# Patient Record
Sex: FEMALE | Race: WHITE | NOT HISPANIC OR LATINO
[De-identification: names, ages, dates, MRNs, and addresses within clinical notes are randomized per-mention and may not be internally consistent; named-entity substitution may affect disease eponyms.]

---

## 2017-06-03 ENCOUNTER — HEALTH MAINTENANCE LETTER (OUTPATIENT)
Age: 32
End: 2017-06-03

## 2017-08-17 ENCOUNTER — TRANSFERRED RECORDS (OUTPATIENT)
Dept: HEALTH INFORMATION MANAGEMENT | Facility: CLINIC | Age: 32
End: 2017-08-17

## 2017-08-17 DIAGNOSIS — Z79.899 HIGH RISK MEDICATION USE: ICD-10-CM

## 2017-08-17 DIAGNOSIS — T43.505A NEUROLEPTIC-INDUCED ACUTE AKATHISIA: Primary | ICD-10-CM

## 2017-08-17 DIAGNOSIS — G25.71 NEUROLEPTIC-INDUCED ACUTE AKATHISIA: Primary | ICD-10-CM

## 2017-08-17 LAB
CHOLEST SERPL-MCNC: 133 MG/DL
GLUCOSE SERPL-MCNC: 95 MG/DL (ref 70–99)
HBA1C MFR BLD: 5.1 % (ref 4.3–6)
HDLC SERPL-MCNC: 43 MG/DL
LDLC SERPL CALC-MCNC: 72 MG/DL
NONHDLC SERPL-MCNC: 90 MG/DL
PROLACTIN SERPL-MCNC: 18 UG/L (ref 3–27)
TRIGL SERPL-MCNC: 91 MG/DL

## 2017-08-17 PROCEDURE — 82947 ASSAY GLUCOSE BLOOD QUANT: CPT | Performed by: INTERNAL MEDICINE

## 2017-08-17 PROCEDURE — 36415 COLL VENOUS BLD VENIPUNCTURE: CPT | Performed by: INTERNAL MEDICINE

## 2017-08-17 PROCEDURE — 84146 ASSAY OF PROLACTIN: CPT | Performed by: PHYSICIAN ASSISTANT

## 2017-08-17 PROCEDURE — 80061 LIPID PANEL: CPT | Performed by: INTERNAL MEDICINE

## 2017-08-17 PROCEDURE — 83036 HEMOGLOBIN GLYCOSYLATED A1C: CPT | Performed by: INTERNAL MEDICINE

## 2019-05-13 ENCOUNTER — DOCUMENTATION ONLY (OUTPATIENT)
Dept: FAMILY MEDICINE | Facility: CLINIC | Age: 34
End: 2019-05-13

## 2019-05-13 NOTE — PROGRESS NOTES
Called patient and she stated she has paper copies she will be bringing with her tomorrow from [facility].     Shalini Schwartz RN

## 2019-05-13 NOTE — PROGRESS NOTES
I have never seen patient.     She saw [facility] but not since 2011.    Unsure what the lab are for. She is a [facility] patient ( psychiatry ).    Please call patient to clarify.    Harsha German MD

## 2019-05-14 DIAGNOSIS — Z79.899 HIGH RISK MEDICATIONS (NOT ANTICOAGULANTS) LONG-TERM USE: Primary | ICD-10-CM

## 2019-05-14 LAB
CHOLEST SERPL-MCNC: 129 MG/DL
GLUCOSE SERPL-MCNC: 81 MG/DL (ref 70–99)
HBA1C MFR BLD: 5.1 % (ref 0–5.6)
HDLC SERPL-MCNC: 43 MG/DL
LDLC SERPL CALC-MCNC: 69 MG/DL
NONHDLC SERPL-MCNC: 86 MG/DL
PROLACTIN SERPL-MCNC: 14 UG/L (ref 3–27)
TRIGL SERPL-MCNC: 87 MG/DL

## 2019-05-14 PROCEDURE — 36415 COLL VENOUS BLD VENIPUNCTURE: CPT | Performed by: PHYSICIAN ASSISTANT

## 2019-05-14 PROCEDURE — 83036 HEMOGLOBIN GLYCOSYLATED A1C: CPT | Performed by: PHYSICIAN ASSISTANT

## 2019-05-14 PROCEDURE — 80061 LIPID PANEL: CPT | Performed by: PHYSICIAN ASSISTANT

## 2019-05-14 PROCEDURE — 84146 ASSAY OF PROLACTIN: CPT | Performed by: PHYSICIAN ASSISTANT

## 2019-05-14 PROCEDURE — 82947 ASSAY GLUCOSE BLOOD QUANT: CPT | Performed by: PHYSICIAN ASSISTANT

## 2019-10-22 ENCOUNTER — MEDICAL CORRESPONDENCE (OUTPATIENT)
Dept: HEALTH INFORMATION MANAGEMENT | Facility: CLINIC | Age: 34
End: 2019-10-22

## 2020-02-17 DIAGNOSIS — Z79.899 ENCOUNTER FOR LONG-TERM (CURRENT) USE OF OTHER MEDICATIONS: Primary | ICD-10-CM

## 2020-02-17 LAB — PROLACTIN SERPL-MCNC: 13 UG/L (ref 3–27)

## 2020-02-17 PROCEDURE — 36415 COLL VENOUS BLD VENIPUNCTURE: CPT

## 2020-02-17 PROCEDURE — 84146 ASSAY OF PROLACTIN: CPT

## 2020-12-30 ENCOUNTER — TRANSFERRED RECORDS (OUTPATIENT)
Dept: HEALTH INFORMATION MANAGEMENT | Facility: CLINIC | Age: 35
End: 2020-12-30

## 2021-06-29 ENCOUNTER — TRANSFERRED RECORDS (OUTPATIENT)
Dept: HEALTH INFORMATION MANAGEMENT | Facility: CLINIC | Age: 36
End: 2021-06-29

## 2021-08-09 ENCOUNTER — LAB (OUTPATIENT)
Dept: LAB | Facility: CLINIC | Age: 36
End: 2021-08-09
Payer: COMMERCIAL

## 2021-08-09 DIAGNOSIS — Z79.899 ENCOUNTER FOR LONG-TERM (CURRENT) USE OF HIGH-RISK MEDICATION: Primary | ICD-10-CM

## 2021-08-09 LAB
CHOLEST SERPL-MCNC: 165 MG/DL
FASTING STATUS PATIENT QL REPORTED: YES
FASTING STATUS PATIENT QL REPORTED: YES
GLUCOSE BLD-MCNC: 91 MG/DL (ref 70–99)
HBA1C MFR BLD: 5.1 % (ref 0–5.6)
HDLC SERPL-MCNC: 44 MG/DL
LDLC SERPL CALC-MCNC: 104 MG/DL
NONHDLC SERPL-MCNC: 121 MG/DL
PROLACTIN SERPL-MCNC: 14 UG/L (ref 3–27)
TRIGL SERPL-MCNC: 83 MG/DL

## 2021-08-09 PROCEDURE — 84146 ASSAY OF PROLACTIN: CPT

## 2021-08-09 PROCEDURE — 36415 COLL VENOUS BLD VENIPUNCTURE: CPT

## 2021-08-09 PROCEDURE — 80061 LIPID PANEL: CPT

## 2021-08-09 PROCEDURE — 83036 HEMOGLOBIN GLYCOSYLATED A1C: CPT

## 2021-08-09 PROCEDURE — 82947 ASSAY GLUCOSE BLOOD QUANT: CPT

## 2021-10-15 ENCOUNTER — OFFICE VISIT (OUTPATIENT)
Dept: FAMILY MEDICINE | Facility: CLINIC | Age: 36
End: 2021-10-15
Payer: COMMERCIAL

## 2021-10-15 VITALS
RESPIRATION RATE: 8 BRPM | DIASTOLIC BLOOD PRESSURE: 81 MMHG | HEIGHT: 67 IN | WEIGHT: 293 LBS | OXYGEN SATURATION: 99 % | SYSTOLIC BLOOD PRESSURE: 122 MMHG | HEART RATE: 73 BPM | BODY MASS INDEX: 45.99 KG/M2 | TEMPERATURE: 97.7 F

## 2021-10-15 DIAGNOSIS — F20.9 SCHIZOPHRENIA, UNSPECIFIED TYPE (H): ICD-10-CM

## 2021-10-15 DIAGNOSIS — N92.1 MENORRHAGIA WITH IRREGULAR CYCLE: Primary | ICD-10-CM

## 2021-10-15 DIAGNOSIS — Z23 NEED FOR PROPHYLACTIC VACCINATION AND INOCULATION AGAINST INFLUENZA: ICD-10-CM

## 2021-10-15 LAB
BASOPHILS # BLD AUTO: 0 10E3/UL (ref 0–0.2)
BASOPHILS NFR BLD AUTO: 0 %
EOSINOPHIL # BLD AUTO: 0.2 10E3/UL (ref 0–0.7)
EOSINOPHIL NFR BLD AUTO: 2 %
ERYTHROCYTE [DISTWIDTH] IN BLOOD BY AUTOMATED COUNT: 14.9 % (ref 10–15)
HCT VFR BLD AUTO: 31.3 % (ref 35–47)
HGB BLD-MCNC: 9.9 G/DL (ref 11.7–15.7)
LYMPHOCYTES # BLD AUTO: 1.8 10E3/UL (ref 0.8–5.3)
LYMPHOCYTES NFR BLD AUTO: 24 %
MCH RBC QN AUTO: 27.3 PG (ref 26.5–33)
MCHC RBC AUTO-ENTMCNC: 31.6 G/DL (ref 31.5–36.5)
MCV RBC AUTO: 86 FL (ref 78–100)
MONOCYTES # BLD AUTO: 0.4 10E3/UL (ref 0–1.3)
MONOCYTES NFR BLD AUTO: 6 %
NEUTROPHILS # BLD AUTO: 4.9 10E3/UL (ref 1.6–8.3)
NEUTROPHILS NFR BLD AUTO: 67 %
PLATELET # BLD AUTO: 272 10E3/UL (ref 150–450)
RBC # BLD AUTO: 3.63 10E6/UL (ref 3.8–5.2)
TSH SERPL DL<=0.005 MIU/L-ACNC: 1.64 MU/L (ref 0.4–4)
WBC # BLD AUTO: 7.3 10E3/UL (ref 4–11)

## 2021-10-15 PROCEDURE — 99204 OFFICE O/P NEW MOD 45 MIN: CPT | Mod: 25 | Performed by: PHYSICIAN ASSISTANT

## 2021-10-15 PROCEDURE — 90686 IIV4 VACC NO PRSV 0.5 ML IM: CPT | Performed by: PHYSICIAN ASSISTANT

## 2021-10-15 PROCEDURE — 36415 COLL VENOUS BLD VENIPUNCTURE: CPT | Performed by: PHYSICIAN ASSISTANT

## 2021-10-15 PROCEDURE — 90471 IMMUNIZATION ADMIN: CPT | Performed by: PHYSICIAN ASSISTANT

## 2021-10-15 PROCEDURE — 85025 COMPLETE CBC W/AUTO DIFF WBC: CPT | Performed by: PHYSICIAN ASSISTANT

## 2021-10-15 PROCEDURE — 84443 ASSAY THYROID STIM HORMONE: CPT | Performed by: PHYSICIAN ASSISTANT

## 2021-10-15 RX ORDER — BUPROPION HYDROCHLORIDE 300 MG/1
TABLET ORAL
COMMUNITY
Start: 2021-08-11

## 2021-10-15 RX ORDER — NORGESTIMATE AND ETHINYL ESTRADIOL 7DAYSX3 28
1 KIT ORAL DAILY
Qty: 84 TABLET | Refills: 1 | Status: SHIPPED | OUTPATIENT
Start: 2021-10-15 | End: 2022-04-04

## 2021-10-15 RX ORDER — BENZTROPINE MESYLATE 2 MG/1
2 TABLET ORAL AT BEDTIME
COMMUNITY
Start: 2021-09-28

## 2021-10-15 ASSESSMENT — PAIN SCALES - GENERAL: PAINLEVEL: NO PAIN (0)

## 2021-10-15 ASSESSMENT — MIFFLIN-ST. JEOR: SCORE: 2290.16

## 2021-10-15 NOTE — PROGRESS NOTES
"    Assessment & Plan     Menorrhagia with irregular cycle  Labs, will restart OCP. Discussed potential side effects and how to start medications.   - TSH WITH FREE T4 REFLEX; Future  - CBC with Platelets & Differential; Future  - US Pelvic Complete with Transvaginal; Future  - TSH WITH FREE T4 REFLEX  - CBC with Platelets & Differential    Adult BMI 50.0-59.9 kg/sq m (H)  Suspect with weight and possible PCOS could be contirbuting.     Schizophrenia, unspecified type (H)  Managed by [facility].     Need for prophylactic vaccination and inoculation against influenza  - INFLUENZA VACCINE IM > 6 MONTHS VALENT IIV4 (AFLURIA/FLUZONE)             BMI:   Estimated body mass index is 53.27 kg/m  as calculated from the following:    Height as of this encounter: 1.714 m (5' 7.48\").    Weight as of this encounter: 156.5 kg (345 lb).           Return in about 4 weeks (around 11/12/2021) for Physical Exam.    DAVID Joe [facility] SHAN Sales is a 36 year old who presents for the following health issues   HPI   Chief Complaint   Patient presents with     Abnormal Bleeding Problem     Her period stopped 1 week ago. It started 2.5 months ago. Spotting, not heavy. No painful cramping. Some blood clots.   When she was on the birth control it was helpful.   Not sexually active currently. Has been previously.Has never had a pap smear.   No vaginal discharge.   Denies possibility of pregnancy.     Patient's schizophrenia is managed through [facility].   No personal or family history of blood clots.    Review of Systems   Constitutional, HEENT, cardiovascular, pulmonary, gi and gu systems are negative, except as otherwise noted.      Objective    /81 (BP Location: Right arm)   Pulse 73   Temp 97.7  F (36.5  C) (Oral)   Resp 8   Ht 1.714 m (5' 7.48\")   Wt (!) 156.5 kg (345 lb)   SpO2 99%   BMI 53.27 kg/m    Body mass index is 53.27 kg/m .  Physical Exam   GENERAL: healthy, alert and no " distress  ABDOMEN: soft, nontender, no hepatosplenomegaly, no masses   PSYCH: mentation appears normal, affect flat/blunted

## 2021-10-18 ENCOUNTER — TELEPHONE (OUTPATIENT)
Dept: FAMILY MEDICINE | Facility: CLINIC | Age: 36
End: 2021-10-18

## 2021-10-18 DIAGNOSIS — N92.1 MENORRHAGIA WITH IRREGULAR CYCLE: Primary | ICD-10-CM

## 2021-10-18 RX ORDER — FERROUS SULFATE 325(65) MG
325 TABLET ORAL
Qty: 90 TABLET | Refills: 0 | Status: SHIPPED | OUTPATIENT
Start: 2021-10-18 | End: 2022-09-30

## 2021-10-18 NOTE — TELEPHONE ENCOUNTER
RN called patient and relayed provider's message. Patient verbalized understanding.     Cherelle Rae RN, BSN, PHN  [facility]: [place]

## 2021-10-18 NOTE — TELEPHONE ENCOUNTER
Please call patient. She needs to start taking ferrous sulfate 325mg once daily. She should make sure to schedule her pelvic ultrasound and follow up in 6 weeks in clinic or virtually with PCP. RN can send to preferred pharmacy.   Tresa Sosa PA-C

## 2021-10-19 ENCOUNTER — ANCILLARY PROCEDURE (OUTPATIENT)
Dept: ULTRASOUND IMAGING | Facility: CLINIC | Age: 36
End: 2021-10-19
Attending: PHYSICIAN ASSISTANT
Payer: COMMERCIAL

## 2021-10-19 DIAGNOSIS — N92.1 MENORRHAGIA WITH IRREGULAR CYCLE: ICD-10-CM

## 2021-10-19 PROCEDURE — 76830 TRANSVAGINAL US NON-OB: CPT | Performed by: RADIOLOGY

## 2021-10-19 PROCEDURE — 76856 US EXAM PELVIC COMPLETE: CPT | Performed by: RADIOLOGY

## 2021-10-19 NOTE — RESULT ENCOUNTER NOTE
Please call the patient with these results:    Mónica, your ultrasound shows a simple cyst on the right ovary. Typically these go away on their own and do not need any treatment or follow up.    Shari Keller, CNP

## 2021-11-29 ENCOUNTER — OFFICE VISIT (OUTPATIENT)
Dept: FAMILY MEDICINE | Facility: CLINIC | Age: 36
End: 2021-11-29
Payer: COMMERCIAL

## 2021-11-29 VITALS
OXYGEN SATURATION: 98 % | TEMPERATURE: 98.4 F | WEIGHT: 293 LBS | HEART RATE: 124 BPM | SYSTOLIC BLOOD PRESSURE: 139 MMHG | BODY MASS INDEX: 53.58 KG/M2 | DIASTOLIC BLOOD PRESSURE: 85 MMHG

## 2021-11-29 DIAGNOSIS — Z12.4 SCREENING FOR CERVICAL CANCER: ICD-10-CM

## 2021-11-29 DIAGNOSIS — Z00.00 ENCOUNTER FOR MEDICARE ANNUAL WELLNESS EXAM: Primary | ICD-10-CM

## 2021-11-29 DIAGNOSIS — Z11.59 NEED FOR HEPATITIS C SCREENING TEST: ICD-10-CM

## 2021-11-29 DIAGNOSIS — F20.9 SCHIZOPHRENIA, UNSPECIFIED TYPE (H): ICD-10-CM

## 2021-11-29 DIAGNOSIS — Z11.4 SCREENING FOR HIV (HUMAN IMMUNODEFICIENCY VIRUS): ICD-10-CM

## 2021-11-29 DIAGNOSIS — D62 ANEMIA DUE TO BLOOD LOSS, ACUTE: ICD-10-CM

## 2021-11-29 LAB
BASOPHILS # BLD AUTO: 0 10E3/UL (ref 0–0.2)
BASOPHILS NFR BLD AUTO: 0 %
EOSINOPHIL # BLD AUTO: 0.2 10E3/UL (ref 0–0.7)
EOSINOPHIL NFR BLD AUTO: 2 %
ERYTHROCYTE [DISTWIDTH] IN BLOOD BY AUTOMATED COUNT: 16.2 % (ref 10–15)
FERRITIN SERPL-MCNC: 17 NG/ML (ref 12–150)
HCT VFR BLD AUTO: 29.8 % (ref 35–47)
HGB BLD-MCNC: 8.9 G/DL (ref 11.7–15.7)
LYMPHOCYTES # BLD AUTO: 2.1 10E3/UL (ref 0.8–5.3)
LYMPHOCYTES NFR BLD AUTO: 24 %
MCH RBC QN AUTO: 26.9 PG (ref 26.5–33)
MCHC RBC AUTO-ENTMCNC: 29.9 G/DL (ref 31.5–36.5)
MCV RBC AUTO: 90 FL (ref 78–100)
MONOCYTES # BLD AUTO: 0.7 10E3/UL (ref 0–1.3)
MONOCYTES NFR BLD AUTO: 8 %
NEUTROPHILS # BLD AUTO: 5.8 10E3/UL (ref 1.6–8.3)
NEUTROPHILS NFR BLD AUTO: 66 %
PLATELET # BLD AUTO: 246 10E3/UL (ref 150–450)
RBC # BLD AUTO: 3.31 10E6/UL (ref 3.8–5.2)
WBC # BLD AUTO: 8.8 10E3/UL (ref 4–11)

## 2021-11-29 PROCEDURE — 90471 IMMUNIZATION ADMIN: CPT | Performed by: PHYSICIAN ASSISTANT

## 2021-11-29 PROCEDURE — 87389 HIV-1 AG W/HIV-1&-2 AB AG IA: CPT | Performed by: PHYSICIAN ASSISTANT

## 2021-11-29 PROCEDURE — G0145 SCR C/V CYTO,THINLAYER,RESCR: HCPCS | Performed by: PHYSICIAN ASSISTANT

## 2021-11-29 PROCEDURE — 85025 COMPLETE CBC W/AUTO DIFF WBC: CPT | Performed by: PHYSICIAN ASSISTANT

## 2021-11-29 PROCEDURE — 87624 HPV HI-RISK TYP POOLED RSLT: CPT | Performed by: PHYSICIAN ASSISTANT

## 2021-11-29 PROCEDURE — 99395 PREV VISIT EST AGE 18-39: CPT | Mod: 25 | Performed by: PHYSICIAN ASSISTANT

## 2021-11-29 PROCEDURE — 86803 HEPATITIS C AB TEST: CPT | Performed by: PHYSICIAN ASSISTANT

## 2021-11-29 PROCEDURE — 90715 TDAP VACCINE 7 YRS/> IM: CPT | Performed by: PHYSICIAN ASSISTANT

## 2021-11-29 PROCEDURE — 82728 ASSAY OF FERRITIN: CPT | Performed by: PHYSICIAN ASSISTANT

## 2021-11-29 PROCEDURE — 36415 COLL VENOUS BLD VENIPUNCTURE: CPT | Performed by: PHYSICIAN ASSISTANT

## 2021-11-29 ASSESSMENT — ENCOUNTER SYMPTOMS
BREAST MASS: 0
HEMATURIA: 0
CHILLS: 0
SORE THROAT: 0
DIZZINESS: 0
EYE PAIN: 0
WEAKNESS: 0
JOINT SWELLING: 0
SHORTNESS OF BREATH: 0
DYSURIA: 0
CONSTIPATION: 0
PALPITATIONS: 0
ARTHRALGIAS: 0
DIARRHEA: 0
HEARTBURN: 0
ABDOMINAL PAIN: 0
HEMATOCHEZIA: 0
FREQUENCY: 0
PARESTHESIAS: 0
MYALGIAS: 0
HEADACHES: 0
NERVOUS/ANXIOUS: 0
FEVER: 0
NAUSEA: 0
COUGH: 0

## 2021-11-29 ASSESSMENT — PAIN SCALES - GENERAL: PAINLEVEL: NO PAIN (0)

## 2021-11-29 ASSESSMENT — PATIENT HEALTH QUESTIONNAIRE - PHQ9
SUM OF ALL RESPONSES TO PHQ QUESTIONS 1-9: 4
SUM OF ALL RESPONSES TO PHQ QUESTIONS 1-9: 4
10. IF YOU CHECKED OFF ANY PROBLEMS, HOW DIFFICULT HAVE THESE PROBLEMS MADE IT FOR YOU TO DO YOUR WORK, TAKE CARE OF THINGS AT HOME, OR GET ALONG WITH OTHER PEOPLE: NOT DIFFICULT AT ALL

## 2021-11-29 ASSESSMENT — ACTIVITIES OF DAILY LIVING (ADL): CURRENT_FUNCTION: NO ASSISTANCE NEEDED

## 2021-11-29 NOTE — PROGRESS NOTES
"SUBJECTIVE:   Mónica Yoon is a 36 year old female who presents for Preventive Visit.      Patient has been advised of split billing requirements and indicates understanding: Yes   Are you in the first 12 months of your Medicare coverage?  No    Healthy Habits:     In general, how would you rate your overall health?  Fair    Frequency of exercise:  None    Do you usually eat at least 4 servings of fruit and vegetables a day, include whole grains    & fiber and avoid regularly eating high fat or \"junk\" foods?  No    Taking medications regularly:  Yes    Medication side effects:  None    Ability to successfully perform activities of daily living:  No assistance needed    Home Safety:  No safety concerns identified    Hearing Impairment:  No hearing concerns    In the past 6 months, have you been bothered by leaking of urine?  No    In general, how would you rate your overall mental or emotional health?  Good      PHQ-2 Total Score: 4    Additional concerns today:  No    Not currently sexually active. Has been in the past. Has never had a pap smear.   No current vaginal bleeding - hasn't been bleeding in about a week.   Lives with her mom and sister. Not working.     Do you feel safe in your environment? Yes    Have you ever done Advance Care Planning? (For example, a Health Directive, POLST, or a discussion with a medical provider or your loved ones about your wishes): No, advance care planning information given to patient to review.  Patient declined advance care planning discussion at this time.       Fall risk  Fallen 2 or more times in the past year?: No  Any fall with injury in the past year?: No    Cognitive Screening   1) Repeat 3 items (Leader, Season, Table)    2) Clock draw: NORMAL  3) 3 item recall: Recalls 1 object   Results: NORMAL clock, 1-2 items recalled: COGNITIVE IMPAIRMENT LESS LIKELY    Mini-CogTM Copyright [employer]. Licensed by the author for use in [facility]; reprinted with " permission (dasha@[facility]). All rights reserved.      Do you have sleep apnea, excessive snoring or daytime drowsiness?: no    Reviewed and updated as needed this visit by clinical staff  Tobacco  Allergies  Meds   Med Hx  Surg Hx  Fam Hx  Soc Hx       Reviewed and updated as needed this visit by Provider               Social History     Tobacco Use     Smoking status: Former Smoker     Smokeless tobacco: Never Used     Tobacco comment: quit 2010   Substance Use Topics     Alcohol use: No     If you drink alcohol do you typically have >3 drinks per day or >7 drinks per week? No    Alcohol Use 11/29/2021   Prescreen: >3 drinks/day or >7 drinks/week? No   Prescreen: >3 drinks/day or >7 drinks/week? -   No flowsheet data found.          Current providers sharing in care for this patient include:   Patient Care Team:  Harsha German MD as PCP - Tresa William PA-C as Assigned PCP    The following health maintenance items are reviewed in Epic and correct as of today:  Health Maintenance Due   Topic Date Due     HIV SCREENING  Never done     HEPATITIS B IMMUNIZATION (2 of 3 - 3-dose primary series) 09/09/1999     HEPATITIS C SCREENING  Never done     PAP  Never done     BP Readings from Last 3 Encounters:   11/29/21 139/85   10/15/21 122/81   12/21/11 107/78    Wt Readings from Last 3 Encounters:   11/29/21 (!) 157.4 kg (347 lb 0.5 oz)   10/15/21 (!) 156.5 kg (345 lb)   12/21/11 (!) 169.4 kg (373 lb 8 oz)                History of abnormal Pap smear: Last 3 Pap Results: No results found for: PAP  Last 3 Pap and HPV Results:      Breast CA Risk Assessment (FHS-7) 11/29/2021   Do you have a family history of breast, colon, or ovarian cancer? No / Unknown         Patient under 40 years of age: Routine Mammogram Screening not recommended.   Pertinent mammograms are reviewed under the imaging tab.    Review of Systems   Constitutional: Negative for chills and fever.   HENT: Negative for congestion, ear pain,  "hearing loss and sore throat.    Eyes: Negative for pain and visual disturbance.   Respiratory: Negative for cough and shortness of breath.    Cardiovascular: Negative for chest pain, palpitations and peripheral edema.   Gastrointestinal: Negative for abdominal pain, constipation, diarrhea, heartburn, hematochezia and nausea.   Breasts:  Negative for tenderness, breast mass and discharge.   Genitourinary: Negative for dysuria, frequency, genital sores, hematuria, pelvic pain, urgency, vaginal bleeding and vaginal discharge.   Musculoskeletal: Negative for arthralgias, joint swelling and myalgias.   Skin: Negative for rash.   Neurological: Negative for dizziness, weakness, headaches and paresthesias.   Psychiatric/Behavioral: Negative for mood changes. The patient is not nervous/anxious.        OBJECTIVE:   /85 (BP Location: Right arm, Patient Position: Chair, Cuff Size: Adult Large)   Pulse (!) 124   Temp 98.4  F (36.9  C) (Oral)   Wt (!) 157.4 kg (347 lb 0.5 oz)   LMP  (LMP Unknown)   SpO2 98%   BMI 53.58 kg/m   Estimated body mass index is 53.58 kg/m  as calculated from the following:    Height as of 10/15/21: 1.714 m (5' 7.48\").    Weight as of this encounter: 157.4 kg (347 lb 0.5 oz).  Physical Exam  GENERAL: healthy, alert and no distress  EYES: Eyes grossly normal to inspection, PERRL and conjunctivae and sclerae normal  HENT: ear canals and TM's normal, nose and mouth without ulcers or lesions  NECK: no adenopathy, no asymmetry, masses, or scars and thyroid normal to palpation  RESP: lungs clear to auscultation - no rales, rhonchi or wheezes  BREAST: normal without masses, tenderness or nipple discharge and no palpable axillary masses or adenopathy  CV: regular rate and rhythm, normal S1 S2, no S3 or S4, no murmur, click or rub, no peripheral edema and peripheral pulses strong  ABDOMEN: soft, nontender, no hepatosplenomegaly, no masses and bowel sounds normal   (female): normal female external " "genitalia, normal urethral meatus, vaginal mucosa pink, moist, well rugated, and normal cervix/adnexa/uterus without masses or discharge  MS: no gross musculoskeletal defects noted, no edema  SKIN: no suspicious lesions or rashes  NEURO: Normal strength and tone, mentation intact and speech normal  PSYCH: mentation appears normal, affect normal/bright    Diagnostic Test Results:  Labs reviewed in Epic    ASSESSMENT / PLAN:   1. Encounter for Medicare annual wellness exam  Well adult.   - CBC with platelets and differential; Future  - Ferritin; Future  - Ferritin  - CBC with platelets and differential    2. Anemia due to blood loss, acute  Recheck anemia.   - CBC with platelets and differential; Future  - Ferritin; Future  - Ferritin  - CBC with platelets and differential    3. Screening for HIV (human immunodeficiency virus)  Screen.  - HIV Antigen Antibody Combo; Future  - HIV Antigen Antibody Combo    4. Need for hepatitis C screening test  Screen.  - Hepatitis C Screen Reflex to HCV RNA Quant and Genotype; Future  - Hepatitis C Screen Reflex to HCV RNA Quant and Genotype    5. Screening for cervical cancer  Screen.  - Pap Screen with HPV - recommended age 30 - 65 years    6. Schizophrenia  Managed by psychiatry.      Patient has been advised of split billing requirements and indicates understanding: Yes  COUNSELING:  Special attention given to:       Regular exercise       Healthy diet/nutrition       Immunizations    Vaccinated for: TDAP          Estimated body mass index is 53.58 kg/m  as calculated from the following:    Height as of 10/15/21: 1.714 m (5' 7.48\").    Weight as of this encounter: 157.4 kg (347 lb 0.5 oz).    Weight management plan: Discussed healthy diet and exercise guidelines    She reports that she has quit smoking. She has never used smokeless tobacco.      Appropriate preventive services were discussed with this patient, including applicable screening as appropriate for cardiovascular " disease, diabetes, osteopenia/osteoporosis, and glaucoma.  As appropriate for age/gender, discussed screening for colorectal cancer, prostate cancer, breast cancer, and cervical cancer. Checklist reviewing preventive services available has been given to the patient.    Reviewed patients plan of care and provided an AVS. The Intermediate Care Plan ( asthma action plan, low back pain action plan, and migraine action plan) for Mónica meets the Care Plan requirement. This Care Plan has been established and reviewed with the Patient.    Counseling Resources:  ATP IV Guidelines  Pooled Cohorts Equation Calculator  Breast Cancer Risk Calculator  Breast Cancer: Medication to Reduce Risk  FRAX Risk Assessment  ICSI Preventive Guidelines  Dietary Guidelines for Americans, 2010  [employer]'s MyPlate  ASA Prophylaxis  Lung CA Screening    DAVID Dhaliwal [facility]    Identified Health Risks:  Answers for HPI/ROS submitted by the patient on 11/29/2021  If you checked off any problems, how difficult have these problems made it for you to do your work, take care of things at home, or get along with other people?: Not difficult at all  PHQ9 TOTAL SCORE: 4

## 2021-11-29 NOTE — NURSING NOTE
Prior to immunization administration, verified patients identity using patient s name and date of birth. Please see Immunization Activity for additional information.     Screening Questionnaire for Adult Immunization    Are you sick today?   No   Do you have allergies to medications, food, a vaccine component or latex?   No   Have you ever had a serious reaction after receiving a vaccination?   No   Do you have a long-term health problem with heart, lung, kidney, or metabolic disease (e.g., diabetes), asthma, a blood disorder, no spleen, complement component deficiency, a cochlear implant, or a spinal fluid leak?  Are you on long-term aspirin therapy?   No   Do you have cancer, leukemia, HIV/AIDS, or any other immune system problem?   No   Do you have a parent, brother, or sister with an immune system problem?   No   In the past 3 months, have you taken medications that affect  your immune system, such as prednisone, other steroids, or anticancer drugs; drugs for the treatment of rheumatoid arthritis, Crohn s disease, or psoriasis; or have you had radiation treatments?   No   Have you had a seizure, or a brain or other nervous system problem?   No   During the past year, have you received a transfusion of blood or blood    products, or been given immune (gamma) globulin or antiviral drug?   No   For women: Are you pregnant or is there a chance you could become       pregnant during the next month?   No   Have you received any vaccinations in the past 4 weeks?   No     Immunization questionnaire answers were all negative.      Vaccine information supplied.    Per orders of Danyell Douglas, injection of Tdap given by Shirley Perez. Patient instructed to remain in clinic for 15 minutes afterwards, and to report any adverse reaction to me immediately.       Screening performed by Shirley Perez on 11/29/2021 at 3:19 PM.

## 2021-11-29 NOTE — LETTER
December 1, 2021      Mónica Yoon  [address] [place] [address] [address]  [facility] [address]        Dear ,    We are writing to inform you of your test results.    Labs attached. Hemoglobin is low as discussed with the nurses over the phone. Recheck this with a lab only appointment in 3-4 weeks. All other labs looked good.        Resulted Orders   Ferritin   Result Value Ref Range    Ferritin 17 12 - 150 ng/mL   Hepatitis C Screen Reflex to HCV RNA Quant and Genotype   Result Value Ref Range    Hepatitis C Antibody Nonreactive Nonreactive    Narrative    Assay performance characteristics have not been established for newborns, infants, and children.   HIV Antigen Antibody Combo   Result Value Ref Range    HIV Antigen Antibody Combo Nonreactive Nonreactive      Comment:      HIV-1 p24 Ag & HIV-1/HIV-2 Ab Not Detected   CBC with platelets and differential   Result Value Ref Range    WBC Count 8.8 4.0 - 11.0 10e3/uL    RBC Count 3.31 (L) 3.80 - 5.20 10e6/uL    Hemoglobin 8.9 (L) 11.7 - 15.7 g/dL    Hematocrit 29.8 (L) 35.0 - 47.0 %    MCV 90 78 - 100 fL    MCH 26.9 26.5 - 33.0 pg    MCHC 29.9 (L) 31.5 - 36.5 g/dL    RDW 16.2 (H) 10.0 - 15.0 %    Platelet Count 246 150 - 450 10e3/uL    % Neutrophils 66 %    % Lymphocytes 24 %    % Monocytes 8 %    % Eosinophils 2 %    % Basophils 0 %    Absolute Neutrophils 5.8 1.6 - 8.3 10e3/uL    Absolute Lymphocytes 2.1 0.8 - 5.3 10e3/uL    Absolute Monocytes 0.7 0.0 - 1.3 10e3/uL    Absolute Eosinophils 0.2 0.0 - 0.7 10e3/uL    Absolute Basophils 0.0 0.0 - 0.2 10e3/uL       If you have any questions or concerns, please call the clinic at the number listed above.       Sincerely,      Danyell Douglas PA-C/sindhu

## 2021-11-29 NOTE — PATIENT INSTRUCTIONS
Patient Education   Personalized Prevention Plan  You are due for the preventive services outlined below.  Your care team is available to assist you in scheduling these services.  If you have already completed any of these items, please share that information with your care team to update in your medical record.  Health Maintenance Due   Topic Date Due     ANNUAL REVIEW OF HM ORDERS  Never done     Discuss Advance Care Planning  Never done     HIV Screening  Never done     Hepatitis B Vaccine (2 of 3 - 3-dose primary series) 09/09/1999     Annual Wellness Visit  Never done     Hepatitis C Screening  Never done     PAP Smear  Never done     Diptheria Tetanus Pertussis (DTAP/TDAP/TD) Vaccine (7 - Td or Tdap) 06/22/2021

## 2021-11-30 LAB
HCV AB SERPL QL IA: NONREACTIVE
HIV 1+2 AB+HIV1 P24 AG SERPL QL IA: NONREACTIVE

## 2021-11-30 ASSESSMENT — PATIENT HEALTH QUESTIONNAIRE - PHQ9: SUM OF ALL RESPONSES TO PHQ QUESTIONS 1-9: 4

## 2021-12-01 ENCOUNTER — TELEPHONE (OUTPATIENT)
Dept: FAMILY MEDICINE | Facility: CLINIC | Age: 36
End: 2021-12-01
Payer: COMMERCIAL

## 2021-12-01 DIAGNOSIS — D62 ANEMIA DUE TO BLOOD LOSS, ACUTE: Primary | ICD-10-CM

## 2021-12-01 LAB
BKR LAB AP GYN ADEQUACY: NORMAL
BKR LAB AP GYN INTERPRETATION: NORMAL
BKR LAB AP HPV REFLEX: NORMAL
BKR LAB AP PREVIOUS ABNORMAL: NORMAL
PATH REPORT.COMMENTS IMP SPEC: NORMAL
PATH REPORT.COMMENTS IMP SPEC: NORMAL
PATH REPORT.RELEVANT HX SPEC: NORMAL

## 2021-12-01 NOTE — TELEPHONE ENCOUNTER
Call patient.     Her hemoglobin decreased a bit more from her previous lab. This is likely due to the persistent vaginal bleeding. Because this has now stopped - we should recheck the lab in 3-4 weeks to make sure it is going back up. Continue the iron supplementation daily. Eat iron rich foods.   I placed a lab order for her to have this done in a lab only visit.     All other labs looked good.    Danyell Douglas PA-C

## 2021-12-01 NOTE — TELEPHONE ENCOUNTER
Patient notified of provider message as written. Patient verbalized understanding. Lab appointment scheduled 12/28/21    Hayde Gaming RN

## 2021-12-03 LAB
HUMAN PAPILLOMA VIRUS 16 DNA: NEGATIVE
HUMAN PAPILLOMA VIRUS 18 DNA: NEGATIVE
HUMAN PAPILLOMA VIRUS FINAL DIAGNOSIS: NORMAL
HUMAN PAPILLOMA VIRUS OTHER HR: NEGATIVE

## 2022-01-03 ENCOUNTER — LAB (OUTPATIENT)
Dept: LAB | Facility: CLINIC | Age: 37
End: 2022-01-03
Payer: COMMERCIAL

## 2022-01-03 DIAGNOSIS — D62 ANEMIA DUE TO BLOOD LOSS, ACUTE: ICD-10-CM

## 2022-01-03 LAB
ERYTHROCYTE [DISTWIDTH] IN BLOOD BY AUTOMATED COUNT: 15.9 % (ref 10–15)
HCT VFR BLD AUTO: 37.4 % (ref 35–47)
HGB BLD-MCNC: 11.2 G/DL (ref 11.7–15.7)
MCH RBC QN AUTO: 26.3 PG (ref 26.5–33)
MCHC RBC AUTO-ENTMCNC: 29.9 G/DL (ref 31.5–36.5)
MCV RBC AUTO: 88 FL (ref 78–100)
PLATELET # BLD AUTO: 256 10E3/UL (ref 150–450)
RBC # BLD AUTO: 4.26 10E6/UL (ref 3.8–5.2)
WBC # BLD AUTO: 10.6 10E3/UL (ref 4–11)

## 2022-01-03 PROCEDURE — 36415 COLL VENOUS BLD VENIPUNCTURE: CPT

## 2022-01-03 PROCEDURE — 85027 COMPLETE CBC AUTOMATED: CPT

## 2022-01-21 ENCOUNTER — TRANSFERRED RECORDS (OUTPATIENT)
Dept: HEALTH INFORMATION MANAGEMENT | Facility: CLINIC | Age: 37
End: 2022-01-21
Payer: COMMERCIAL

## 2022-04-02 DIAGNOSIS — N92.1 MENORRHAGIA WITH IRREGULAR CYCLE: Primary | ICD-10-CM

## 2022-04-04 RX ORDER — NORGESTIMATE AND ETHINYL ESTRADIOL 7DAYSX3 28
KIT ORAL
Qty: 84 TABLET | Refills: 1 | Status: SHIPPED | OUTPATIENT
Start: 2022-04-04 | End: 2022-09-30

## 2022-04-04 NOTE — TELEPHONE ENCOUNTER
"Prescription approved per [facility] Refill Protocol.  Requested Prescriptions   Pending Prescriptions Disp Refills     TRI-SPRINTEC 0.18/0.215/0.25 MG-35 MCG tablet [Pharmacy Med Name: TRI-SPRINTEC TABLETS 28S] 84 tablet 1     Sig: TAKE 1 TABLET BY MOUTH DAILY       Contraceptives Protocol Passed - 4/2/2022  3:59 AM        Passed - Patient is not a current smoker if age is 35 or older        Passed - Recent (12 mo) or future (30 days) visit within the authorizing provider's specialty     Patient has had an office visit with the authorizing provider or a provider within the authorizing providers department within the previous 12 mos or has a future within next 30 days. See \"Patient Info\" tab in inbasket, or \"Choose Columns\" in Meds & Orders section of the refill encounter.              Passed - Medication is active on med list        Passed - No active pregnancy on record        Passed - No positive pregnancy test in past 12 months             "

## 2022-06-08 ENCOUNTER — TRANSFERRED RECORDS (OUTPATIENT)
Dept: HEALTH INFORMATION MANAGEMENT | Facility: CLINIC | Age: 37
End: 2022-06-08
Payer: COMMERCIAL

## 2022-09-22 ENCOUNTER — TRANSFERRED RECORDS (OUTPATIENT)
Dept: HEALTH INFORMATION MANAGEMENT | Facility: CLINIC | Age: 37
End: 2022-09-22

## 2022-09-30 ENCOUNTER — OFFICE VISIT (OUTPATIENT)
Dept: FAMILY MEDICINE | Facility: CLINIC | Age: 37
End: 2022-09-30
Payer: COMMERCIAL

## 2022-09-30 VITALS
BODY MASS INDEX: 52.34 KG/M2 | SYSTOLIC BLOOD PRESSURE: 128 MMHG | TEMPERATURE: 97.1 F | HEART RATE: 61 BPM | DIASTOLIC BLOOD PRESSURE: 75 MMHG | WEIGHT: 293 LBS | OXYGEN SATURATION: 98 %

## 2022-09-30 DIAGNOSIS — N92.6 MENSTRUAL BLEEDING PROBLEM: ICD-10-CM

## 2022-09-30 DIAGNOSIS — Z23 ENCOUNTER FOR IMMUNIZATION: ICD-10-CM

## 2022-09-30 DIAGNOSIS — I80.01 THROMBOPHLEBITIS OF SUPERFICIAL VEINS OF RIGHT LOWER EXTREMITY: Primary | ICD-10-CM

## 2022-09-30 DIAGNOSIS — E66.01 MORBID OBESITY (H): ICD-10-CM

## 2022-09-30 PROCEDURE — 90686 IIV4 VACC NO PRSV 0.5 ML IM: CPT | Performed by: FAMILY MEDICINE

## 2022-09-30 PROCEDURE — G0008 ADMIN INFLUENZA VIRUS VAC: HCPCS | Performed by: FAMILY MEDICINE

## 2022-09-30 PROCEDURE — 99214 OFFICE O/P EST MOD 30 MIN: CPT | Mod: 25 | Performed by: FAMILY MEDICINE

## 2022-09-30 RX ORDER — RIVAROXABAN 10 MG/1
10 TABLET, FILM COATED ORAL DAILY
COMMUNITY
Start: 2022-09-09 | End: 2022-09-30

## 2022-09-30 RX ORDER — RIVAROXABAN 10 MG/1
10 TABLET, FILM COATED ORAL DAILY
Qty: 30 TABLET | Refills: 0 | Status: SHIPPED | OUTPATIENT
Start: 2022-09-30 | End: 2022-10-31

## 2022-09-30 ASSESSMENT — PAIN SCALES - GENERAL: PAINLEVEL: NO PAIN (0)

## 2022-09-30 NOTE — PROGRESS NOTES
Dolores Sales is a 37 year old , presenting for the following health issues:  Patient Request      History of Present Illness       Reason for visit:  Leg    She eats 2-3 servings of fruits and vegetables daily.She consumes 4 sweetened beverage(s) daily.She exercises with enough effort to increase her heart rate 9 or less minutes per day.  She exercises with enough effort to increase her heart rate 3 or less days per week.   She is taking medications regularly.        Review of Systems   Patient stopped the birth control ( was on it to control bleeding, not contraception )    Nonsmoker  Quit years ago      Reviewed emergency room note in detail    No family history of clots      -      Objective    BP (!) 138/90 (BP Location: Right arm, Patient Position: Chair, Cuff Size: Adult Regular)   Pulse 61   Temp 97.1  F (36.2  C) (Temporal)   Wt (!) 153.8 kg (339 lb)   SpO2 98%   Breastfeeding No   BMI 52.34 kg/m    Body mass index is 52.34 kg/m .  Physical Exam  Constitutional:       Appearance: She is well-developed.   HENT:      Head: Normocephalic and atraumatic.   Eyes:      Conjunctiva/sclera: Conjunctivae normal.   Neck:      Vascular: No carotid bruit.   Cardiovascular:      Rate and Rhythm: Normal rate and regular rhythm.      Heart sounds: Normal heart sounds.   Pulmonary:      Effort: Pulmonary effort is normal. No respiratory distress.      Breath sounds: Normal breath sounds.   Neurological:      Mental Status: She is alert and oriented to person, place, and time.      no pretibial pitting edema  Patient does have extensive varicose veins in both legs  Several area of the veins on right leg were hard, but not tender ( per patient it was red and swollen previously )        ASSESSMENT / PLAN:  (I80.01) Thrombophlebitis of superficial veins of right lower extremity  (primary encounter diagnosis)  Comment: Up to Date advises treating for 45 days for this indication.  To simplify things we did  another month of the med to add on to the month she already had.  In mid to late October she will schedule a follow up ultrasound.  If clots gone, then can finish up prescription blood thinner but if clot still present may want to extend prescription.  She did not have dvt.   Plan: XARELTO ANTICOAGULANT 10 MG TABS tablet, US         Lower Extremity Venous Duplex Right             (Z23) Encounter for immunization  Comment: needs   Plan: INFLUENZA VACCINE IM > 6 MONTHS VALENT IIV4         (AFLURIA/FLUZONE), SCREENING QUESTIONS FOR         ADULT IMMUNIZATIONS        Given     (E66.01) Morbid obesity (H)  Comment: this is one risk factor for clots   Plan: keep working on healthy diet/exercise and wt loss    (N93.9) Menstrual bleeding problem  Comment: patient was on ocp for this.  The ocp is another risk factor for clots but she stopped the ocp and has not had the bleeding problem.  If bleeding issues recur then see gyn.   Plan: as above.  Stay off ocp.    Stay well hydrated and increase walking as able       I reviewed the patient's medications, allergies, medical history, family history, and social history.    Harsha German MD

## 2022-09-30 NOTE — PATIENT INSTRUCTIONS
Stay off birth control pill    Stay well hydrated    Increase walking as able    Stay on Xarelto 10 mg daily for now; refilled prescription     Schedule ultrasound for mid to late October to recheck clots; call [phone] to schedule    Be seen promptly if symptoms acutely worsen

## 2022-10-07 ENCOUNTER — LAB (OUTPATIENT)
Dept: LAB | Facility: CLINIC | Age: 37
End: 2022-10-07
Payer: COMMERCIAL

## 2022-10-07 DIAGNOSIS — Z13.220 SCREENING FOR HYPERLIPIDEMIA: Primary | ICD-10-CM

## 2022-10-07 LAB
CHOLEST SERPL-MCNC: 153 MG/DL
FASTING STATUS PATIENT QL REPORTED: YES
HDLC SERPL-MCNC: 32 MG/DL
LDLC SERPL CALC-MCNC: 94 MG/DL
NONHDLC SERPL-MCNC: 121 MG/DL
TRIGL SERPL-MCNC: 136 MG/DL

## 2022-10-07 PROCEDURE — 80061 LIPID PANEL: CPT

## 2022-10-07 PROCEDURE — 36415 COLL VENOUS BLD VENIPUNCTURE: CPT

## 2022-10-07 NOTE — LETTER
October 10, 2022    Mónica Yoon  [address]    [place] [address]          Dear ,    We are writing to inform you of your test results.    Cholesterol is okay; better than last year.  Keep working on healthy diet/exercise.        Resulted Orders   Lipid panel reflex to direct LDL Non-fasting   Result Value Ref Range    Cholesterol 153 <200 mg/dL    Triglycerides 136 <150 mg/dL    Direct Measure HDL 32 (L) >=50 mg/dL    LDL Cholesterol Calculated 94 <=100 mg/dL    Non HDL Cholesterol 121 <130 mg/dL    Patient Fasting > 8hrs? Yes     Narrative    Cholesterol  Desirable:  <200 mg/dL    Triglycerides  Normal:  Less than 150 mg/dL  Borderline High:  150-199 mg/dL  High:  200-499 mg/dL  Very High:  Greater than or equal to 500 mg/dL    Direct Measure HDL  Female:  Greater than or equal to 50 mg/dL   Male:  Greater than or equal to 40 mg/dL    LDL Cholesterol  Desirable:  <100mg/dL  Above Desirable:  100-129 mg/dL   Borderline High:  130-159 mg/dL   High:  160-189 mg/dL   Very High:  >= 190 mg/dL    Non HDL Cholesterol  Desirable:  130 mg/dL  Above Desirable:  130-159 mg/dL  Borderline High:  160-189 mg/dL  High:  190-219 mg/dL  Very High:  Greater than or equal to 220 mg/dL       If you have any questions or concerns, please call the clinic at the number listed above.       Sincerely,      Harsha German MD

## 2022-10-08 NOTE — RESULT ENCOUNTER NOTE
Cholesterol is okay; better than last year.  Keep working on healthy diet/exercise.    Harsha German MD

## 2022-10-26 ENCOUNTER — TRANSFERRED RECORDS (OUTPATIENT)
Dept: HEALTH INFORMATION MANAGEMENT | Facility: CLINIC | Age: 37
End: 2022-10-26

## 2022-10-27 ENCOUNTER — ANCILLARY PROCEDURE (OUTPATIENT)
Dept: ULTRASOUND IMAGING | Facility: CLINIC | Age: 37
End: 2022-10-27
Attending: FAMILY MEDICINE
Payer: COMMERCIAL

## 2022-10-27 DIAGNOSIS — I80.01 THROMBOPHLEBITIS OF SUPERFICIAL VEINS OF RIGHT LOWER EXTREMITY: ICD-10-CM

## 2022-10-27 PROCEDURE — 93971 EXTREMITY STUDY: CPT | Mod: TC | Performed by: RADIOLOGY

## 2022-10-27 NOTE — LETTER
October 28, 2022    Mónica Yoon  [address]    [place] [address]          Dear ,    We are writing to inform you of your test results.    You still have clot present in some of the superficial veins but not the deep veins.  I will try to call in the next few days to discuss in detail.         Resulted Orders   US Lower Extremity Venous Duplex Right    Narrative    VENOUS ULTRASOUND RIGHT LEG  10/27/2022 1:26 PM     HISTORY: pt had superficial thrombophlebitis at [facility] early  September 2022.      COMPARISON: A previous ultrasound from [facility] is not available  for review.    FINDINGS:  Examination of the deep veins with graded compression and  color flow Doppler with spectral wave form analysis was performed.   There is no evidence for DVT in the lower extremities.    There is nonocclusive superficial venous thrombus in the right greater  saphenous vein extending from the distal thigh to the mid calf and in  the left greater saphenous vein extending from the distal thigh to the  proximal calf.      Impression    IMPRESSION: No evidence of deep venous thrombosis.  Superficial venous  thrombus in the greater saphenous veins bilaterally.    RICHMOND HERRERA MD         SYSTEM ID:  [id number]       If you have any questions or concerns, please call the clinic at the number listed above.       Sincerely,      Harsha German MD

## 2022-10-28 ENCOUNTER — TELEPHONE (OUTPATIENT)
Dept: FAMILY MEDICINE | Facility: CLINIC | Age: 37
End: 2022-10-28

## 2022-10-28 DIAGNOSIS — I80.01 THROMBOPHLEBITIS OF SUPERFICIAL VEINS OF RIGHT LOWER EXTREMITY: ICD-10-CM

## 2022-10-28 NOTE — RESULT ENCOUNTER NOTE
You still have clot present in some of the superficial veins but not the deep veins.  I will try to call in the next few days to discuss in detail.    Harsha German MD

## 2022-10-31 RX ORDER — RIVAROXABAN 10 MG/1
10 TABLET, FILM COATED ORAL DAILY
Qty: 30 TABLET | Refills: 1 | Status: SHIPPED | OUTPATIENT
Start: 2022-10-31 | End: 2023-01-02

## 2022-11-01 NOTE — TELEPHONE ENCOUNTER
I called and discussed in detail with patient   Despite twe months of prescription blood thinner still has quite extensive superficial clot  I refilled prescription and advised she see hematology    I did referral and gave her number to call and schedule    Harsha German MD

## 2022-11-07 DIAGNOSIS — F25.1 SCHIZOAFFECTIVE DISORDER, DEPRESSIVE TYPE (H): ICD-10-CM

## 2022-11-07 DIAGNOSIS — Z79.899 ENCOUNTER FOR LONG-TERM (CURRENT) USE OF HIGH-RISK MEDICATION: Primary | ICD-10-CM

## 2022-11-10 ENCOUNTER — LAB (OUTPATIENT)
Dept: LAB | Facility: CLINIC | Age: 37
End: 2022-11-10
Payer: COMMERCIAL

## 2022-11-10 DIAGNOSIS — Z79.899 ENCOUNTER FOR LONG-TERM (CURRENT) USE OF HIGH-RISK MEDICATION: ICD-10-CM

## 2022-11-10 DIAGNOSIS — F25.1 SCHIZOAFFECTIVE DISORDER, DEPRESSIVE TYPE (H): ICD-10-CM

## 2022-11-10 LAB
CHOLEST SERPL-MCNC: 166 MG/DL
FASTING STATUS PATIENT QL REPORTED: YES
FASTING STATUS PATIENT QL REPORTED: YES
GLUCOSE BLD-MCNC: 98 MG/DL (ref 70–99)
HBA1C MFR BLD: 5.6 % (ref 0–5.6)
HDLC SERPL-MCNC: 35 MG/DL
LDLC SERPL CALC-MCNC: 107 MG/DL
NONHDLC SERPL-MCNC: 131 MG/DL
PROLACTIN SERPL 3RD IS-MCNC: 63 NG/ML (ref 5–23)
TRIGL SERPL-MCNC: 122 MG/DL

## 2022-11-10 PROCEDURE — 80061 LIPID PANEL: CPT

## 2022-11-10 PROCEDURE — 83036 HEMOGLOBIN GLYCOSYLATED A1C: CPT

## 2022-11-10 PROCEDURE — 36415 COLL VENOUS BLD VENIPUNCTURE: CPT

## 2022-11-10 PROCEDURE — 84146 ASSAY OF PROLACTIN: CPT

## 2022-11-10 PROCEDURE — 82947 ASSAY GLUCOSE BLOOD QUANT: CPT

## 2022-12-03 NOTE — PROGRESS NOTES
[facility]  [facility] [address] [place] [address]  Phone: [phone], Fax: [phone]    Outpatient Visit Note:    Patient: Mónica Yoon  MRN: [id number]  : 1985  KIRIT: Dec 6, 2022    Reason for Consultation:  Mónica Yoon is referred for evaluation and treatment of superficial vein thrombosis.    Assessment:  Mónica Yoon is a 37 year old woman with superficial vein thrombosis in the context of OCP use, varicose veins and morbid obesity.    She has multiple risk factors for venous thrombosis, including combined oral contraceptive use, varicose veins, and obesity.  She has stopped her oral contraceptive, which reduces her future risk, though does not remove it entirely.  Reassuringly she does not have any deep venous thrombosis associated with the SVT.  However given the extent of the SVTs and the proximity to the deep venous system, 3 months of anticoagulation is appropriate.  Ideally she would have been treated with full dose rivaroxaban 20 mg daily.  She has been treated with 10 mg daily which is a prophylactic dose.  However, given her symptomatic improvement, and her history of significant menorrhagia, I think there is not a compelling reason to increase the dose at this point since it has already been 3 months of treatment.  I will continue treatment for 1-2 more months, and at that time we can discuss stopping treatment.    Separately, she is also iron deficient.  We will start oral iron once every other day.    Plan:  -Continue rivaroxaban 10 mg  -Start oral iron once every other day  -Return to clinic in 2 months to assess response to iron and to likely stop anticoagulation therapy  -consider compression stockings    The patient is given our center's contact information and is instructed to call if she should have any further questions or concerns.    Dominga Philippe, nurse clinician, is assigned to provide patient care coordination and education.     Patient  understands and agrees with the above plan and recommendation.    Jacob Cogan, MD  Hematology    30 minutes spent on the date of the encounter doing chart review, history and exam, documentation and further activities per the note    ----------------------------------------------------------------------------------------------------------------------  History of Present Illness:  Mónica Yoon is a 37 year old woman with a history of prior smoking, morbid obesity, schizophrenia, referred for evaluation of persistent extensive superficial lower extremity clots despite anticoagulation.    Her initial ultrasound was on September 8, 2022.  She presented to the ER at that time with right leg swelling for 3 days.  At that time she was on an oral contraceptive.  The right lower extremity was notable for multiple varicosities in the distal thigh and calf with an acute appearing expansive occlusive thrombus draining into the greater saphenous vein.  There was nonocclusive thrombus in a long segment of the greater saphenous vein from the distal thigh to the mid calf level.    It appears she has taken rivaroxaban for 2-3 months. 10/27 LE US showed non-occlusive superficial clot in the R greater saphenous vein extending from the distal thigh to the mid-calf and in the left greater saphenous vein extending from the distal thigh to the proximal calf.  No evidence of DVT.     She appears to have longstanding microcytic anemia.  Her last CBC was notable for a Hgb of 11.2 and MCV of 88. Her ferritin was 17 in 11/2021.    She presents today reporting improvement in her lower extremity pain and itching.  She has been taking Xarelto 10 mg a day, and says she has been taking this the whole time.  She has not had issues with bleeding.  She stopped taking her combined oral contraceptive once she was diagnosed with SVT in September.  She has never had a blood clot before that she knows of.  No fevers, night sweats, weight loss, rashes,  adenopathy.      Past Medical History:  No past medical history on file.    Past Surgical History:  No past surgical history on file.    Medications:  Current Outpatient Medications   Medication Sig Dispense Refill     benztropine (COGENTIN) 2 MG tablet Take 2 mg by mouth At Bedtime       buPROPion (WELLBUTRIN XL) 150 MG 24 hr tablet Take 150 mg by mouth every morning. 1 tablet daily       buPROPion (WELLBUTRIN XL) 300 MG 24 hr tablet TAKE 1 TABLET BY MOUTH EVERY DAY ALONG WITH 150MG TABLET       DULoxetine (CYMBALTA) 60 MG capsule Take 60 mg by mouth daily. 1 tablet daily       Ferrous Sulfate (IRON) 325 (65 Fe) MG tablet Take 1 tablet by mouth every 48 hours Take one pill every other day 1 tablet 4     risperiDONE (RISPERDAL) 4 MG tablet Take 4 mg by mouth daily.       XARELTO ANTICOAGULANT 10 MG TABS tablet Take 1 tablet (10 mg) by mouth daily 30 tablet 1        Allergies:  No Known Allergies    ROS:  Remainder of a comprehensive 14 point ROS is negative unless noted above.    Social History:  Denies any tobacco use. No significant alcohol use. Denies any illicit drug use.     Family History:  Non-contributory to the current presentation    Objectives:  /87 (BP Location: Right arm, Patient Position: Sitting, Cuff Size: Adult Regular)   Pulse (!) 129   Temp 97.4  F (36.3  C) (Oral)   Wt (!) 152.4 kg (336 lb)   SpO2 98%   BMI 51.88 kg/m    Exam:   Constitutional: Appears well, no distress  HEENT: Pupils equal and reactive to light. No scleral icterus or hemorrhage. Nares without evidence of telangiectasia. Mucous membranes moist with no wet purpura. Dentition overall ok with no signs of decay. No pharyngeal exudates. No lymphadenopathy, no thyromeagaly  CV: regular rate and rhythm, no murmurs  Respiratory: clear  GI: abdomen soft, nontender, without guarding or rebound. No hepatomeagaly. No splenomegaly.   Mus/Skele: Swollen bilateral lower extremities, 1+ pitting edema, visualized varicosities  bilaterally  Skin: no petechiae, no ecchymosis.  Neuro: CN II-XII intact. Normal gait. AOx3  Heme/Lymph: no supraclavicular, axillary or umbilical adenopathy.     Labs:  WBC   Date Value Ref Range Status   06/01/2011 6.1 4.0 - 11.0 10e9/L Final     WBC Count   Date Value Ref Range Status   01/03/2022 10.6 4.0 - 11.0 10e3/uL Final   11/29/2021 8.8 4.0 - 11.0 10e3/uL Final   10/15/2021 7.3 4.0 - 11.0 10e3/uL Final     Hemoglobin   Date Value Ref Range Status   01/03/2022 11.2 (L) 11.7 - 15.7 g/dL Final   11/29/2021 8.9 (L) 11.7 - 15.7 g/dL Final   10/15/2021 9.9 (L) 11.7 - 15.7 g/dL Final   06/01/2011 10.7 (L) 11.7 - 15.7 g/dL Final     Hematocrit   Date Value Ref Range Status   01/03/2022 37.4 35.0 - 47.0 % Final   11/29/2021 29.8 (L) 35.0 - 47.0 % Final   10/15/2021 31.3 (L) 35.0 - 47.0 % Final   06/01/2011 33.6 (L) 35.0 - 47.0 % Final     Platelet Count   Date Value Ref Range Status   01/03/2022 256 150 - 450 10e3/uL Final   11/29/2021 246 150 - 450 10e3/uL Final   10/15/2021 272 150 - 450 10e3/uL Final   06/01/2011 256 150 - 450 10e9/L Final         Imaging:  US Lower Extremity Venous Duplex Right  Narrative: VENOUS ULTRASOUND RIGHT LEG  10/27/2022 1:26 PM     HISTORY: pt had superficial thrombophlebitis at [facility] early  September 2022.      COMPARISON: A previous ultrasound from [facility] is not available  for review.    FINDINGS:  Examination of the deep veins with graded compression and  color flow Doppler with spectral wave form analysis was performed.   There is no evidence for DVT in the lower extremities.    There is nonocclusive superficial venous thrombus in the right greater  saphenous vein extending from the distal thigh to the mid calf and in  the left greater saphenous vein extending from the distal thigh to the  proximal calf.  Impression: IMPRESSION: No evidence of deep venous thrombosis.  Superficial venous  thrombus in the greater saphenous veins bilaterally.    RICHMOND HERREAR MD          SYSTEM ID:  [id number]

## 2022-12-06 ENCOUNTER — OFFICE VISIT (OUTPATIENT)
Dept: HEMATOLOGY | Facility: CLINIC | Age: 37
End: 2022-12-06
Attending: FAMILY MEDICINE
Payer: COMMERCIAL

## 2022-12-06 VITALS
WEIGHT: 293 LBS | BODY MASS INDEX: 51.88 KG/M2 | HEART RATE: 129 BPM | OXYGEN SATURATION: 98 % | TEMPERATURE: 97.4 F | DIASTOLIC BLOOD PRESSURE: 87 MMHG | SYSTOLIC BLOOD PRESSURE: 136 MMHG

## 2022-12-06 DIAGNOSIS — I80.01 THROMBOPHLEBITIS OF SUPERFICIAL VEINS OF RIGHT LOWER EXTREMITY: ICD-10-CM

## 2022-12-06 PROCEDURE — G0463 HOSPITAL OUTPT CLINIC VISIT: HCPCS

## 2022-12-06 PROCEDURE — 99204 OFFICE O/P NEW MOD 45 MIN: CPT | Performed by: STUDENT IN AN ORGANIZED HEALTH CARE EDUCATION/TRAINING PROGRAM

## 2022-12-06 RX ORDER — PNV NO.95/FERROUS FUM/FOLIC AC 28MG-0.8MG
1 TABLET ORAL
Qty: 1 TABLET | Refills: 4 | Status: SHIPPED | OUTPATIENT
Start: 2022-12-06 | End: 2022-12-07

## 2022-12-07 RX ORDER — PNV NO.95/FERROUS FUM/FOLIC AC 28MG-0.8MG
1 TABLET ORAL
Qty: 15 TABLET | Refills: 4 | Status: SHIPPED | OUTPATIENT
Start: 2022-12-07 | End: 2023-05-11

## 2022-12-16 ENCOUNTER — TELEPHONE (OUTPATIENT)
Dept: HEMATOLOGY | Facility: CLINIC | Age: 37
End: 2022-12-16

## 2022-12-16 DIAGNOSIS — E61.1 IRON DEFICIENCY: Primary | ICD-10-CM

## 2023-01-30 ENCOUNTER — LAB (OUTPATIENT)
Dept: LAB | Facility: CLINIC | Age: 38
End: 2023-01-30
Payer: COMMERCIAL

## 2023-01-30 DIAGNOSIS — E61.1 IRON DEFICIENCY: ICD-10-CM

## 2023-01-30 LAB
BASOPHILS # BLD AUTO: 0 10E3/UL (ref 0–0.2)
BASOPHILS NFR BLD AUTO: 1 %
EOSINOPHIL # BLD AUTO: 0.1 10E3/UL (ref 0–0.7)
EOSINOPHIL NFR BLD AUTO: 2 %
ERYTHROCYTE [DISTWIDTH] IN BLOOD BY AUTOMATED COUNT: 16.1 % (ref 10–15)
FERRITIN SERPL-MCNC: 46 NG/ML (ref 6–175)
HCT VFR BLD AUTO: 38.5 % (ref 35–47)
HGB BLD-MCNC: 11.9 G/DL (ref 11.7–15.7)
IRON BINDING CAPACITY (ROCHE): 273 UG/DL (ref 240–430)
IRON SATN MFR SERPL: 22 % (ref 15–46)
IRON SERPL-MCNC: 60 UG/DL (ref 37–145)
LYMPHOCYTES # BLD AUTO: 1.9 10E3/UL (ref 0.8–5.3)
LYMPHOCYTES NFR BLD AUTO: 30 %
MCH RBC QN AUTO: 26.7 PG (ref 26.5–33)
MCHC RBC AUTO-ENTMCNC: 30.9 G/DL (ref 31.5–36.5)
MCV RBC AUTO: 87 FL (ref 78–100)
MONOCYTES # BLD AUTO: 0.4 10E3/UL (ref 0–1.3)
MONOCYTES NFR BLD AUTO: 7 %
NEUTROPHILS # BLD AUTO: 4 10E3/UL (ref 1.6–8.3)
NEUTROPHILS NFR BLD AUTO: 62 %
PLATELET # BLD AUTO: 296 10E3/UL (ref 150–450)
RBC # BLD AUTO: 4.45 10E6/UL (ref 3.8–5.2)
WBC # BLD AUTO: 6.4 10E3/UL (ref 4–11)

## 2023-01-30 PROCEDURE — 83550 IRON BINDING TEST: CPT

## 2023-01-30 PROCEDURE — 82728 ASSAY OF FERRITIN: CPT

## 2023-01-30 PROCEDURE — 36415 COLL VENOUS BLD VENIPUNCTURE: CPT

## 2023-01-30 PROCEDURE — 83540 ASSAY OF IRON: CPT

## 2023-01-30 PROCEDURE — 85025 COMPLETE CBC W/AUTO DIFF WBC: CPT

## 2023-02-03 NOTE — PROGRESS NOTES
[facility]  [facility] [address] [place] [address]  Phone: [phone], Fax: [phone]    Outpatient Visit Note:    Patient: Mónica Yoon  MRN: [id number]  : 1985  KIRIT: 2023      Assessment:  Mónica Yoon is a 37 year old woman with a likely provoked lower extremity superficial venous thrombosis in the context of OCP use, varicose veins and morbid obesity.    She was taking an OCP at the time of her superficial venous thrombosis, and this has since been discontinued.  She has the ongoing risk factors of obesity and varicose veins, but I do not think these are strong enough in and of themselves to merit indefinite anticoagulation for this.  Her SVT was somewhat proximal to the deep venous system, so after several months of treatment I think we will repeat the ultrasound now to see if there has been further improvement.  This will help with my ultimate decision regarding duration of anticoagulation and need for ongoing prophylaxis.      Her iron deficiency has improved on oral supplementation, and she should continue this.    Plan:  -Lower extremity ultrasound  -Continue rivaroxaban 10 mg  -Continue oral iron once every other day  -Return to clinic in 3 months to assess response to iron and to likely stop anticoagulation therapy    The patient is given our center's contact information and is instructed to call if she should have any further questions or concerns.    Dominga Philippe, nurse clinician, is assigned to provide patient care coordination and education.     Patient understands and agrees with the above plan and recommendation.    Jacob Cogan, MD  Hematology    30 minutes spent on the date of the encounter doing chart review, history and exam, documentation and further activities per the note    ----------------------------------------------------------------------------------------------------------------------  History of Present Illness:  Mónica Yoon is  a 37 year old woman with a history of prior smoking, morbid obesity, schizophrenia, referred for evaluation of persistent extensive superficial lower extremity clots despite anticoagulation.    Her initial ultrasound was on September 8, 2022.  She presented to the ER at that time with right leg swelling for 3 days.  At that time she was on an oral contraceptive.  The right lower extremity was notable for multiple varicosities in the distal thigh and calf with an acute appearing expansive occlusive thrombus draining into the greater saphenous vein.  There was nonocclusive thrombus in a long segment of the greater saphenous vein from the distal thigh to the mid calf level.    It appears she has taken rivaroxaban for 2-3 months. 10/27 LE US showed non-occlusive superficial clot in the R greater saphenous vein extending from the distal thigh to the mid-calf and in the left greater saphenous vein extending from the distal thigh to the proximal calf.  No evidence of DVT.     She appears to have longstanding microcytic anemia.  Her last CBC was notable for a Hgb of 11.2 and MCV of 88. Her ferritin was 17 in 11/2021.    She presents today reporting improvement in her lower extremity pain and itching.  She has been taking Xarelto 10 mg a day, and says she has been taking this the whole time.  She has not had issues with bleeding.  She stopped taking her combined oral contraceptive once she was diagnosed with SVT in September.  She has never had a blood clot before that she knows of.  No fevers, night sweats, weight loss, rashes, adenopathy.    February 7, 2023: Continues on prophylactic dose rivaroxaban.  Started on oral iron.  Her iron parameters have improved.  No bleeding or bruising on rivaroxaban.  Does not dislike taking the medication.  No other complaints.      Past Medical History:  No past medical history on file.    Past Surgical History:  No past surgical history on file.    Medications:  Current Outpatient  Medications   Medication Sig Dispense Refill     benztropine (COGENTIN) 2 MG tablet Take 2 mg by mouth At Bedtime       buPROPion (WELLBUTRIN XL) 150 MG 24 hr tablet Take 150 mg by mouth every morning. 1 tablet daily       buPROPion (WELLBUTRIN XL) 300 MG 24 hr tablet TAKE 1 TABLET BY MOUTH EVERY DAY ALONG WITH 150MG TABLET       DULoxetine (CYMBALTA) 60 MG capsule Take 60 mg by mouth daily. 1 tablet daily       Ferrous Sulfate (IRON) 325 (65 Fe) MG tablet Take 1 tablet by mouth every 48 hours Take one pill every other day 15 tablet 4     risperiDONE (RISPERDAL) 4 MG tablet Take 4 mg by mouth daily.       XARELTO ANTICOAGULANT 10 MG TABS tablet TAKE 1 TABLET(10 MG) BY MOUTH DAILY 30 tablet 1        Allergies:  No Known Allergies    ROS:  Remainder of a comprehensive 14 point ROS is negative unless noted above.    Social History:  Denies any tobacco use. No significant alcohol use. Denies any illicit drug use.     Family History:  Non-contributory to the current presentation    Objectives:  N/A, video visit    Labs:  WBC   Date Value Ref Range Status   06/01/2011 6.1 4.0 - 11.0 10e9/L Final     WBC Count   Date Value Ref Range Status   01/30/2023 6.4 4.0 - 11.0 10e3/uL Final   01/03/2022 10.6 4.0 - 11.0 10e3/uL Final   11/29/2021 8.8 4.0 - 11.0 10e3/uL Final   10/15/2021 7.3 4.0 - 11.0 10e3/uL Final     Hemoglobin   Date Value Ref Range Status   01/30/2023 11.9 11.7 - 15.7 g/dL Final   01/03/2022 11.2 (L) 11.7 - 15.7 g/dL Final   11/29/2021 8.9 (L) 11.7 - 15.7 g/dL Final   10/15/2021 9.9 (L) 11.7 - 15.7 g/dL Final   06/01/2011 10.7 (L) 11.7 - 15.7 g/dL Final     Hematocrit   Date Value Ref Range Status   01/30/2023 38.5 35.0 - 47.0 % Final   01/03/2022 37.4 35.0 - 47.0 % Final   11/29/2021 29.8 (L) 35.0 - 47.0 % Final   10/15/2021 31.3 (L) 35.0 - 47.0 % Final   06/01/2011 33.6 (L) 35.0 - 47.0 % Final     Platelet Count   Date Value Ref Range Status   01/30/2023 296 150 - 450 10e3/uL Final   01/03/2022 256 150 - 450  10e3/uL Final   11/29/2021 246 150 - 450 10e3/uL Final   10/15/2021 272 150 - 450 10e3/uL Final   06/01/2011 256 150 - 450 10e9/L Final         Imaging:  US Lower Extremity Venous Duplex Right  Narrative: VENOUS ULTRASOUND RIGHT LEG  10/27/2022 1:26 PM     HISTORY: pt had superficial thrombophlebitis at [facility] early  September 2022.      COMPARISON: A previous ultrasound from [facility] is not available  for review.    FINDINGS:  Examination of the deep veins with graded compression and  color flow Doppler with spectral wave form analysis was performed.   There is no evidence for DVT in the lower extremities.    There is nonocclusive superficial venous thrombus in the right greater  saphenous vein extending from the distal thigh to the mid calf and in  the left greater saphenous vein extending from the distal thigh to the  proximal calf.  Impression: IMPRESSION: No evidence of deep venous thrombosis.  Superficial venous  thrombus in the greater saphenous veins bilaterally.    RICHMOND HERRERA MD         SYSTEM ID:  [id number]      Video-Visit Details:  Type of service:  Video Visit  Video Start Time: 2:39  Video End Time (time video stopped): 2:44  Originating Location (pt. Location): Home  Distant Location (provider location):  [facility]   Mode of Communication:  Video Conference via [employer]

## 2023-02-07 ENCOUNTER — VIRTUAL VISIT (OUTPATIENT)
Dept: HEMATOLOGY | Facility: CLINIC | Age: 38
End: 2023-02-07
Attending: STUDENT IN AN ORGANIZED HEALTH CARE EDUCATION/TRAINING PROGRAM
Payer: COMMERCIAL

## 2023-02-07 DIAGNOSIS — E61.1 IRON DEFICIENCY: Primary | ICD-10-CM

## 2023-02-07 DIAGNOSIS — I80.01 THROMBOPHLEBITIS OF SUPERFICIAL VEINS OF RIGHT LOWER EXTREMITY: ICD-10-CM

## 2023-02-07 PROCEDURE — 99214 OFFICE O/P EST MOD 30 MIN: CPT | Mod: 95 | Performed by: STUDENT IN AN ORGANIZED HEALTH CARE EDUCATION/TRAINING PROGRAM

## 2023-02-07 PROCEDURE — G0463 HOSPITAL OUTPT CLINIC VISIT: HCPCS | Mod: PN,GT | Performed by: STUDENT IN AN ORGANIZED HEALTH CARE EDUCATION/TRAINING PROGRAM

## 2023-02-07 NOTE — PROGRESS NOTES
Patient was contacted to complete the pre-visit call prior to their video visit with the provider.      Allergies and medications were reviewed.    I thanked them for their time to cover this information     Wali Hernandez CMA

## 2023-03-09 ENCOUNTER — ANCILLARY PROCEDURE (OUTPATIENT)
Dept: ULTRASOUND IMAGING | Facility: CLINIC | Age: 38
End: 2023-03-09
Attending: STUDENT IN AN ORGANIZED HEALTH CARE EDUCATION/TRAINING PROGRAM
Payer: COMMERCIAL

## 2023-03-09 DIAGNOSIS — I80.01 THROMBOPHLEBITIS OF SUPERFICIAL VEINS OF RIGHT LOWER EXTREMITY: ICD-10-CM

## 2023-03-09 PROCEDURE — 93971 EXTREMITY STUDY: CPT | Mod: TC | Performed by: RADIOLOGY

## 2023-03-16 ENCOUNTER — TELEPHONE (OUTPATIENT)
Dept: FAMILY MEDICINE | Facility: CLINIC | Age: 38
End: 2023-03-16
Payer: COMMERCIAL

## 2023-03-16 NOTE — LETTER
[facility]  [address]  [place] [address]  [phone]          March 27, 2023    Mónica MERCHANT [address]  [facility] [address]            Dear Mónica,    We have been unsuccessful reaching you by telephone. Please call the clinic at [phone] to schedule an appointment with a provider or let us know if you are getting care elsewhere.        Sincerely,         Harsha German MD

## 2023-03-16 NOTE — TELEPHONE ENCOUNTER
Advise a follow up clinic appointment for this patient to be seen in the next few weeks    Please inform patient    Harsha German MD

## 2023-03-16 NOTE — TELEPHONE ENCOUNTER
Dr. German,       Please advise, I did tell pt not to worry about DVT:    IMPRESSION:   1. No evidence of deep venous thrombosis in the right lower extremity.  2. Bilateral superficial thrombus in the great saphenous veins and  varicose veins off of the great saphenous vein, not significantly  changed.  3. Fluid collection in the right popliteal fossa, consistent with  popliteal fossa cyst.     ARTHUR HALEY, DO       [phone], no voice mail set up please re-call.     Thanks,  BERRY Jamison  [facility]

## 2023-03-21 NOTE — TELEPHONE ENCOUNTER
Called patient to schedule appointment. No answer. If patient calls back please assist in scheduling     Zulema-

## 2023-03-27 NOTE — TELEPHONE ENCOUNTER
Called patient to schedule appointment. Caller answered said she was not the patient. Will send letter    Zulema-

## 2023-04-03 ENCOUNTER — TELEPHONE (OUTPATIENT)
Dept: FAMILY MEDICINE | Facility: CLINIC | Age: 38
End: 2023-04-03

## 2023-04-03 ENCOUNTER — TELEPHONE (OUTPATIENT)
Dept: HEMATOLOGY | Facility: CLINIC | Age: 38
End: 2023-04-03
Payer: COMMERCIAL

## 2023-04-03 NOTE — TELEPHONE ENCOUNTER
[id number]  Mónica MERCHANT Organ  37 year old female  [facility] Diagnosis: superficial venous thrombus  [facility] Provider: Dr. Cogan    Call placed to Mónica to review Dr. Cogan's orders to continue her Xarelto and return for a visit in a month.    Staff left a message on her voicemail reviewing this recommendation and gave clinic number for Mónica to call and schedule her return visit with Dr. Cogan.    Nicolle WALTERSN, RN, PHN   [facility]   Office: [phone]  Fax: [phone]

## 2023-04-03 NOTE — TELEPHONE ENCOUNTER
Routing to Dr Jacob Cogan      Pt waiting for results from 3/9 ultrasound                                                                      IMPRESSION:   1. No evidence of deep venous thrombosis in the right lower extremity.  2. Bilateral superficial thrombus in the great saphenous veins and  varicose veins off of the great saphenous vein, not significantly  changed.  3. Fluid collection in the right popliteal fossa, consistent with  popliteal fossa cyst.         BERRY Perez    Triage Nurse  [facility]  Appointment line: [phone]  [facility], 24 hour nurse line, available by calling clinic at [phone] and following prompts.

## 2023-04-17 NOTE — PROGRESS NOTES
[facility]  [facility] [place] [address]  Phone: [phone], Fax: [phone]    Outpatient Visit Note:    Patient: Mónica Yoon  MRN: [id number]  : 1985  KIRIT: 2023      Assessment:  Mónica Yoon is a 37 year old woman with a likely provoked lower extremity superficial venous thrombosis in the context of OCP use, varicose veins and morbid obesity.    She was taking an OCP at the time of her superficial venous thrombosis, and this has since been discontinued.  She has the ongoing risk factors of obesity and varicose veins, but I do not think these are strong enough in and of themselves to merit indefinite anticoagulation for this.  Her SVT was somewhat proximal to the deep venous system, so after several months of treatment I think we will repeat the ultrasound now to see if there has been further improvement.  This will help with my ultimate decision regarding duration of anticoagulation and need for ongoing prophylaxis.      Her iron deficiency has improved on oral supplementation, and she should continue this.    Plan:  -Lower extremity ultrasound  -Continue rivaroxaban 10 mg  -Continue oral iron once every other day  -Return to clinic in 3 months to assess response to iron and to likely stop anticoagulation therapy    The patient is given our center's contact information and is instructed to call if she should have any further questions or concerns.    Dominga Philippe, nurse clinician, is assigned to provide patient care coordination and education.     Patient understands and agrees with the above plan and recommendation.    Jacob Cogan, MD  Hematology    30 minutes spent on the date of the encounter doing chart review, history and exam, documentation and further activities per the note    ----------------------------------------------------------------------------------------------------------------------  History of Present Illness:  Mónica Yoon is  a 37 year old woman with a history of prior smoking, morbid obesity, schizophrenia, referred for evaluation of persistent extensive superficial lower extremity clots despite anticoagulation.    Her initial ultrasound was on September 8, 2022.  She presented to the ER at that time with right leg swelling for 3 days.  At that time she was on an oral contraceptive.  The right lower extremity was notable for multiple varicosities in the distal thigh and calf with an acute appearing expansive occlusive thrombus draining into the greater saphenous vein.  There was nonocclusive thrombus in a long segment of the greater saphenous vein from the distal thigh to the mid calf level.    It appears she has taken rivaroxaban for 2-3 months. 10/27 LE US showed non-occlusive superficial clot in the R greater saphenous vein extending from the distal thigh to the mid-calf and in the left greater saphenous vein extending from the distal thigh to the proximal calf.  No evidence of DVT.     She appears to have longstanding microcytic anemia.  Her last CBC was notable for a Hgb of 11.2 and MCV of 88. Her ferritin was 17 in 11/2021.    She presents today reporting improvement in her lower extremity pain and itching.  She has been taking Xarelto 10 mg a day, and says she has been taking this the whole time.  She has not had issues with bleeding.  She stopped taking her combined oral contraceptive once she was diagnosed with SVT in September.  She has never had a blood clot before that she knows of.  No fevers, night sweats, weight loss, rashes, adenopathy.    February 7, 2023: Continues on prophylactic dose rivaroxaban.  Started on oral iron.  Her iron parameters have improved.  No bleeding or bruising on rivaroxaban.  Does not dislike taking the medication.  No other complaints.    April 18, 2023: Ultrasound on March 9 showed bilateral superficial thrombus in the great saphenous veins and varicose veins off of the great saphenous veins,  not significantly changed from prior.      Past Medical History:  No past medical history on file.    Past Surgical History:  No past surgical history on file.    Medications:  Current Outpatient Medications   Medication Sig Dispense Refill     benztropine (COGENTIN) 2 MG tablet Take 2 mg by mouth At Bedtime       buPROPion (WELLBUTRIN XL) 150 MG 24 hr tablet Take 150 mg by mouth every morning. 1 tablet daily       buPROPion (WELLBUTRIN XL) 300 MG 24 hr tablet TAKE 1 TABLET BY MOUTH EVERY DAY ALONG WITH 150MG TABLET       DULoxetine (CYMBALTA) 60 MG capsule Take 60 mg by mouth daily. 1 tablet daily       Ferrous Sulfate (IRON) 325 (65 Fe) MG tablet Take 1 tablet by mouth every 48 hours Take one pill every other day 15 tablet 4     risperiDONE (RISPERDAL) 4 MG tablet Take 4 mg by mouth daily.       XARELTO ANTICOAGULANT 10 MG TABS tablet TAKE 1 TABLET(10 MG) BY MOUTH DAILY 30 tablet 1        Allergies:  No Known Allergies    ROS:  Remainder of a comprehensive 14 point ROS is negative unless noted above.    Social History:  Denies any tobacco use. No significant alcohol use. Denies any illicit drug use.     Family History:  Non-contributory to the current presentation    Objectives:  N/A, video visit    Labs:  WBC   Date Value Ref Range Status   06/01/2011 6.1 4.0 - 11.0 10e9/L Final     WBC Count   Date Value Ref Range Status   01/30/2023 6.4 4.0 - 11.0 10e3/uL Final   01/03/2022 10.6 4.0 - 11.0 10e3/uL Final   11/29/2021 8.8 4.0 - 11.0 10e3/uL Final   10/15/2021 7.3 4.0 - 11.0 10e3/uL Final     Hemoglobin   Date Value Ref Range Status   01/30/2023 11.9 11.7 - 15.7 g/dL Final   01/03/2022 11.2 (L) 11.7 - 15.7 g/dL Final   11/29/2021 8.9 (L) 11.7 - 15.7 g/dL Final   10/15/2021 9.9 (L) 11.7 - 15.7 g/dL Final   06/01/2011 10.7 (L) 11.7 - 15.7 g/dL Final     Hematocrit   Date Value Ref Range Status   01/30/2023 38.5 35.0 - 47.0 % Final   01/03/2022 37.4 35.0 - 47.0 % Final   11/29/2021 29.8 (L) 35.0 - 47.0 % Final    10/15/2021 31.3 (L) 35.0 - 47.0 % Final   06/01/2011 33.6 (L) 35.0 - 47.0 % Final     Platelet Count   Date Value Ref Range Status   01/30/2023 296 150 - 450 10e3/uL Final   01/03/2022 256 150 - 450 10e3/uL Final   11/29/2021 246 150 - 450 10e3/uL Final   10/15/2021 272 150 - 450 10e3/uL Final   06/01/2011 256 150 - 450 10e9/L Final         Imaging:  US Lower Extremity Venous Duplex Right  Narrative: US LOWER EXTREMITY VENOUS DUPLEX RIGHT 3/9/2023 2:35 PM    CLINICAL HISTORY/INDICATION: Thrombophlebitis of superficial veins of  right lower extremity.    COMPARISON: 10/27/2022     TECHNIQUE: Grayscale, color-flow, and spectral waveform analysis were  performed of the deep veins of the right lower extremity    FINDINGS: The right common femoral vein, femoral vein, popliteal vein  and tibial veins demonstrate normal compressibility, spectral  waveform, color flow and augmentation. Superficial thrombus is noted  in the right great saphenous vein from the distal thigh to the mid  calf. There is also thrombus and varicosities off of the great  saphenous vein in the mid thigh. Complex fluid collection in the right  popliteal fossa measuring 4.8 x 1.3 x 3.0 cm.    The contralateral left common femoral vein demonstrates normal  compressibility, spectral waveform, color flow and augmentation.  Incidentally noted, there is superficial thrombus in the left great  saphenous vein from the distal thigh to the mid calf as well as in  varicosities in the distal thigh and distal calf.  Impression: IMPRESSION:   1. No evidence of deep venous thrombosis in the right lower extremity.  2. Bilateral superficial thrombus in the great saphenous veins and  varicose veins off of the great saphenous vein, not significantly  changed.  3. Fluid collection in the right popliteal fossa, consistent with  popliteal fossa cyst.    ARTHUR HALEY DO         SYSTEM ID:  [id number]

## 2023-04-18 ENCOUNTER — VIRTUAL VISIT (OUTPATIENT)
Dept: HEMATOLOGY | Facility: CLINIC | Age: 38
End: 2023-04-18
Attending: STUDENT IN AN ORGANIZED HEALTH CARE EDUCATION/TRAINING PROGRAM
Payer: COMMERCIAL

## 2023-04-18 DIAGNOSIS — I82.4Y9 DEEP VEIN THROMBOSIS (DVT) OF PROXIMAL LOWER EXTREMITY, UNSPECIFIED CHRONICITY, UNSPECIFIED LATERALITY (H): Primary | ICD-10-CM

## 2023-04-18 PROCEDURE — 99214 OFFICE O/P EST MOD 30 MIN: CPT | Mod: VID | Performed by: STUDENT IN AN ORGANIZED HEALTH CARE EDUCATION/TRAINING PROGRAM

## 2023-04-18 RX ORDER — ARIPIPRAZOLE 10 MG/1
TABLET ORAL
COMMUNITY
Start: 2022-06-08

## 2023-04-18 RX ORDER — BREXPIPRAZOLE 2 MG/1
2 TABLET ORAL EVERY EVENING
COMMUNITY
Start: 2023-04-13 | End: 2023-08-11

## 2023-04-18 NOTE — PROGRESS NOTES
Patient was contacted to complete the pre-visit call prior to their telephone visit with the provider.     Allergies and medications were reviewed.     I thanked them for their time to cover this information.     Wali Hernandez CMA

## 2023-05-11 DIAGNOSIS — I80.01 THROMBOPHLEBITIS OF SUPERFICIAL VEINS OF RIGHT LOWER EXTREMITY: ICD-10-CM

## 2023-05-11 RX ORDER — PNV NO.95/FERROUS FUM/FOLIC AC 28MG-0.8MG
1 TABLET ORAL
Qty: 15 TABLET | Refills: 4 | Status: SHIPPED | OUTPATIENT
Start: 2023-05-11 | End: 2023-08-11

## 2023-05-11 NOTE — PROGRESS NOTES
Fax received from [employer] with a refill request for ferrous sulfate 325mg to take q48hr hours.    Nicolle WALTERSN, RN, PHN   [facility]   Office: [phone]  Fax: [phone]

## 2023-07-25 ENCOUNTER — TELEPHONE (OUTPATIENT)
Dept: FAMILY MEDICINE | Facility: CLINIC | Age: 38
End: 2023-07-25
Payer: COMMERCIAL

## 2023-07-25 DIAGNOSIS — R60.0 BILATERAL LOWER EXTREMITY EDEMA: ICD-10-CM

## 2023-07-25 DIAGNOSIS — I80.00 SUPERFICIAL THROMBOPHLEBITIS OF LOWER EXTREMITY, UNSPECIFIED LATERALITY: Primary | ICD-10-CM

## 2023-07-25 NOTE — TELEPHONE ENCOUNTER
Advise patient finish out current meds, then stop blood thinner  She should call to schedule an ultrasound of the legs to be done in the next few weeks  See me in clinic a few days after the ultrasound so we can discuss this in detail and look at her legs    Please inform patient    Harsha German MD

## 2023-07-25 NOTE — TELEPHONE ENCOUNTER
"Patient reports she has 4 pills left of xarelto - wondering if she needs to continue this medication    Per office visit on 9/30/22:  \"(I80.01) Thrombophlebitis of superficial veins of right lower extremity  (primary encounter diagnosis)  Comment: Up to Date advises treating for 45 days for this indication.  To simplify things we did another month of the med to add on to the month she already had.  In mid to late October she will schedule a follow up ultrasound.  If clots gone, then can finish up prescription blood thinner but if clot still present may want to extend prescription.  She did not have dvt.   Plan: XARELTO ANTICOAGULANT 10 MG TABS tablet, US         Lower Extremity Venous Duplex Right\"    Per US from 3/9/23:  \"IMPRESSION:   1. No evidence of deep venous thrombosis in the right lower extremity.  2. Bilateral superficial thrombus in the great saphenous veins and  varicose veins off of the great saphenous vein, not significantly  changed.  3. Fluid collection in the right popliteal fossa, consistent with  popliteal fossa cyst.\"    Should patient continue anticoag or discontinue?    Kayla Underwood RN  [facility]    "

## 2023-07-25 NOTE — TELEPHONE ENCOUNTER
Pt notified of provider's message as written. Pt was given number for imaging scheduling. Pt will call to schedule and will then schedule with Dr. German.    Lizabeth Grijalva RN  [facility]

## 2023-07-27 ENCOUNTER — ANCILLARY PROCEDURE (OUTPATIENT)
Dept: ULTRASOUND IMAGING | Facility: CLINIC | Age: 38
End: 2023-07-27
Attending: FAMILY MEDICINE
Payer: COMMERCIAL

## 2023-07-27 DIAGNOSIS — R60.0 BILATERAL LOWER EXTREMITY EDEMA: ICD-10-CM

## 2023-07-27 PROCEDURE — 93970 EXTREMITY STUDY: CPT | Mod: TC | Performed by: RADIOLOGY

## 2023-07-27 NOTE — LETTER
July 31, 2023    Mónica Yoon  [address]    [place] [address]          Dear ,    We are writing to inform you of your test results.    The superficial vein clot is still present but a little smaller.     Okay to go off the blood thinner for now since you are running out but advise a clinic appointment with us in the next few weeks so we can examine your legs and discuss this issue in more detail.     Take care.       Resulted Orders   US Lower Extremity Venous Duplex Bilateral    Narrative    VENOUS ULTRASOUND BOTH LEGS  7/27/2023 11:40 AM     HISTORY: Bilateral lower extremity edema    COMPARISON: None.    FINDINGS:  Examination of the deep veins with graded compression and  color flow Doppler with spectral wave form analysis was performed.   There is no evidence for DVT in the lower extremities. There is  superficial venous thrombus in the right greater saphenous vein in the  distal thigh and in the left greater saphenous vein from the distal  thigh to the proximal calf. This appears slightly improved from prior  exams.    Again identified is a Baker's cyst in the right popliteal fossa  measuring 5.0 x 3.2 x 1.4 cm.      Impression    IMPRESSION: No evidence of deep venous thrombosis.  Slight improvement  in superficial venous thrombus in the greater saphenous veins  bilaterally.    RICHMOND HERRERA MD         SYSTEM ID:  [id number]       If you have any questions or concerns, please call the clinic at the number listed above.       Sincerely,      Harsha German MD

## 2023-07-29 NOTE — RESULT ENCOUNTER NOTE
The superficial vein clot is still present but a little smaller.    Okay to go off the blood thinner for now since you are running out but advise a clinic appointment with us in the next few weeks so we can examine your legs and discuss this issue in more detail.    Take care.    Harsha German MD

## 2023-08-01 ENCOUNTER — TELEPHONE (OUTPATIENT)
Dept: FAMILY MEDICINE | Facility: CLINIC | Age: 38
End: 2023-08-01
Payer: COMMERCIAL

## 2023-08-01 DIAGNOSIS — I80.01 THROMBOPHLEBITIS OF SUPERFICIAL VEINS OF RIGHT LOWER EXTREMITY: ICD-10-CM

## 2023-08-01 RX ORDER — RIVAROXABAN 10 MG/1
TABLET, FILM COATED ORAL
Qty: 30 TABLET | Refills: 1 | OUTPATIENT
Start: 2023-08-01

## 2023-08-01 NOTE — TELEPHONE ENCOUNTER
Received refill for prescription blood thinner but patient had gone of this recently I thought    She was to see us in clinic soon so we could examine legs and discuss     Please inform patient    Harsha German MD

## 2023-08-02 NOTE — TELEPHONE ENCOUNTER
Left message on patient's VM requesting a return call to [facility] [phone]     Megan Youngblood RN  [facility]

## 2023-08-03 NOTE — TELEPHONE ENCOUNTER
2nd attempt. Called patient and relayed message. She verbalized understanding. Scheduled appointment with PCP for 08/11/23 at 3:00 pm.    Sheila Forbes RN   [facility]

## 2023-08-11 ENCOUNTER — OFFICE VISIT (OUTPATIENT)
Dept: FAMILY MEDICINE | Facility: CLINIC | Age: 38
End: 2023-08-11
Payer: COMMERCIAL

## 2023-08-11 VITALS
WEIGHT: 293 LBS | HEIGHT: 67 IN | BODY MASS INDEX: 45.99 KG/M2 | SYSTOLIC BLOOD PRESSURE: 123 MMHG | OXYGEN SATURATION: 97 % | TEMPERATURE: 98 F | HEART RATE: 104 BPM | DIASTOLIC BLOOD PRESSURE: 79 MMHG | RESPIRATION RATE: 18 BRPM

## 2023-08-11 DIAGNOSIS — Z00.00 ENCOUNTER FOR MEDICARE ANNUAL WELLNESS EXAM: Primary | ICD-10-CM

## 2023-08-11 DIAGNOSIS — I82.819 CHRONIC SUPERFICIAL VENOUS THROMBOSIS OF LOWER EXTREMITY, UNSPECIFIED LATERALITY: ICD-10-CM

## 2023-08-11 DIAGNOSIS — R53.83 FATIGUE, UNSPECIFIED TYPE: ICD-10-CM

## 2023-08-11 DIAGNOSIS — M71.20 SYNOVIAL CYST OF POPLITEAL SPACE, UNSPECIFIED LATERALITY: ICD-10-CM

## 2023-08-11 DIAGNOSIS — R73.01 IMPAIRED FASTING GLUCOSE: ICD-10-CM

## 2023-08-11 DIAGNOSIS — F20.9 SCHIZOPHRENIA, UNSPECIFIED TYPE (H): ICD-10-CM

## 2023-08-11 DIAGNOSIS — E66.01 MORBID OBESITY (H): ICD-10-CM

## 2023-08-11 DIAGNOSIS — Z86.2 HISTORY OF ANEMIA: ICD-10-CM

## 2023-08-11 LAB
ALBUMIN SERPL BCG-MCNC: 3.9 G/DL (ref 3.5–5.2)
ALP SERPL-CCNC: 89 U/L (ref 35–104)
ALT SERPL W P-5'-P-CCNC: 11 U/L (ref 0–50)
ANION GAP SERPL CALCULATED.3IONS-SCNC: 11 MMOL/L (ref 7–15)
AST SERPL W P-5'-P-CCNC: 20 U/L (ref 0–45)
BASOPHILS # BLD AUTO: 0 10E3/UL (ref 0–0.2)
BASOPHILS NFR BLD AUTO: 0 %
BILIRUB SERPL-MCNC: <0.2 MG/DL
BUN SERPL-MCNC: 8.8 MG/DL (ref 6–20)
CALCIUM SERPL-MCNC: 8.6 MG/DL (ref 8.6–10)
CHLORIDE SERPL-SCNC: 105 MMOL/L (ref 98–107)
CREAT SERPL-MCNC: 0.88 MG/DL (ref 0.51–0.95)
DEPRECATED HCO3 PLAS-SCNC: 24 MMOL/L (ref 22–29)
EOSINOPHIL # BLD AUTO: 0.1 10E3/UL (ref 0–0.7)
EOSINOPHIL NFR BLD AUTO: 1 %
ERYTHROCYTE [DISTWIDTH] IN BLOOD BY AUTOMATED COUNT: 14.4 % (ref 10–15)
FERRITIN SERPL-MCNC: 38 NG/ML (ref 6–175)
GFR SERPL CREATININE-BSD FRML MDRD: 85 ML/MIN/1.73M2
GLUCOSE SERPL-MCNC: 102 MG/DL (ref 70–99)
HBA1C MFR BLD: 5.4 % (ref 0–5.6)
HCT VFR BLD AUTO: 34.6 % (ref 35–47)
HGB BLD-MCNC: 11.2 G/DL (ref 11.7–15.7)
IMM GRANULOCYTES # BLD: 0 10E3/UL
IMM GRANULOCYTES NFR BLD: 0 %
IRON BINDING CAPACITY (ROCHE): 262 UG/DL (ref 240–430)
IRON SATN MFR SERPL: 11 % (ref 15–46)
IRON SERPL-MCNC: 28 UG/DL (ref 37–145)
LYMPHOCYTES # BLD AUTO: 2.7 10E3/UL (ref 0.8–5.3)
LYMPHOCYTES NFR BLD AUTO: 30 %
MCH RBC QN AUTO: 27.3 PG (ref 26.5–33)
MCHC RBC AUTO-ENTMCNC: 32.4 G/DL (ref 31.5–36.5)
MCV RBC AUTO: 84 FL (ref 78–100)
MONOCYTES # BLD AUTO: 0.5 10E3/UL (ref 0–1.3)
MONOCYTES NFR BLD AUTO: 6 %
NEUTROPHILS # BLD AUTO: 5.8 10E3/UL (ref 1.6–8.3)
NEUTROPHILS NFR BLD AUTO: 63 %
PLATELET # BLD AUTO: 305 10E3/UL (ref 150–450)
POTASSIUM SERPL-SCNC: 3.7 MMOL/L (ref 3.4–5.3)
PROT SERPL-MCNC: 6.8 G/DL (ref 6.4–8.3)
RBC # BLD AUTO: 4.1 10E6/UL (ref 3.8–5.2)
SODIUM SERPL-SCNC: 140 MMOL/L (ref 136–145)
TSH SERPL DL<=0.005 MIU/L-ACNC: 1.25 UIU/ML (ref 0.3–4.2)
WBC # BLD AUTO: 9.2 10E3/UL (ref 4–11)

## 2023-08-11 PROCEDURE — 83036 HEMOGLOBIN GLYCOSYLATED A1C: CPT | Performed by: FAMILY MEDICINE

## 2023-08-11 PROCEDURE — 84443 ASSAY THYROID STIM HORMONE: CPT | Performed by: FAMILY MEDICINE

## 2023-08-11 PROCEDURE — 85025 COMPLETE CBC W/AUTO DIFF WBC: CPT | Performed by: FAMILY MEDICINE

## 2023-08-11 PROCEDURE — 99213 OFFICE O/P EST LOW 20 MIN: CPT | Mod: 25 | Performed by: FAMILY MEDICINE

## 2023-08-11 PROCEDURE — 83540 ASSAY OF IRON: CPT | Performed by: FAMILY MEDICINE

## 2023-08-11 PROCEDURE — 83550 IRON BINDING TEST: CPT | Performed by: FAMILY MEDICINE

## 2023-08-11 PROCEDURE — 36415 COLL VENOUS BLD VENIPUNCTURE: CPT | Performed by: FAMILY MEDICINE

## 2023-08-11 PROCEDURE — 99395 PREV VISIT EST AGE 18-39: CPT | Performed by: FAMILY MEDICINE

## 2023-08-11 PROCEDURE — 82728 ASSAY OF FERRITIN: CPT | Performed by: FAMILY MEDICINE

## 2023-08-11 PROCEDURE — 80053 COMPREHEN METABOLIC PANEL: CPT | Performed by: FAMILY MEDICINE

## 2023-08-11 RX ORDER — ASPIRIN 81 MG/1
81 TABLET ORAL DAILY
Qty: 100 TABLET | Refills: 3 | Status: SHIPPED | OUTPATIENT
Start: 2023-08-11 | End: 2024-09-08

## 2023-08-11 ASSESSMENT — PAIN SCALES - GENERAL: PAINLEVEL: NO PAIN (0)

## 2023-08-11 NOTE — PROGRESS NOTES
"      Dolores Sales is a 38 year old, presenting for the following health issues:  RECHECK and Wellness Visit        8/11/2023     3:12 PM   Additional Questions   Roomed by Virginia Brooks       History of Present Illness       Reason for visit:  Leg    She eats 2-3 servings of fruits and vegetables daily.She consumes 5 sweetened beverage(s) daily.She exercises with enough effort to increase her heart rate 9 or less minutes per day.  She exercises with enough effort to increase her heart rate 3 or less days per week.   She is taking medications regularly.       Annual Wellness Visit     Patient has been advised of split billing requirements and indicates understanding: Yes     Are you in the first 12 months of your Medicare Part B coverage?  No    Physical Health:  In general, how would you rate your overall physical health? good  Outside of work, how many days during the week do you exercise?none  Outside of work, approximately how many minutes a day do you exercise?not applicable  If you drink alcohol do you typically have >3 drinks per day or >7 drinks per week? No  Do you usually eat at least 4 servings of fruit and vegetables a day, include whole grains & fiber and avoid regularly eating high fat or \"junk\" foods? Yes  Do you have any problems taking medications regularly? No  Do you have any side effects from medications? none  Needs assistance for the following daily activities: no assistance needed  Which of the following safety concerns are present in your home?  none identified   Hearing impairment: No  In the past 6 months, have you been bothered by leaking of urine? no    Mental Health:  In general, how would you rate your overall mental or emotional health? good  PHQ-2 Score: 0    Do you feel safe in your environment? Yes    Have you ever done Advance Care Planning? (For example, a Health Directive, POLST, or a discussion with a medical provider or your loved ones about your wishes)? No, advance " care planning information given to patient to review.  Patient declined advance care planning discussion at this time.    Fall risk:  Fallen 2 or more times in the past year?: No  Any fall with injury in the past year?: No    Cognitive Screening: patient declined    Do you have sleep apnea, excessive snoring or daytime drowsiness? : no    Social History     Tobacco Use    Smoking status: Former    Smokeless tobacco: Never    Tobacco comments:     quit 2010   Substance Use Topics    Alcohol use: No             11/29/2021     2:43 PM   Alcohol Use   Prescreen: >3 drinks/day or >7 drinks/week? No          No data to display                Do you have a current opioid prescription? No  Do you use any other controlled substances or medications that are not prescribed by a provider? None     Current providers sharing in care for this patient include:    Patient Care Team:  Harsha German MD as PCP - General  Cogan, Jacob, MD as Assigned Cancer Care Provider  Harsha German MD as Assigned PCP    The following health maintenance items are reviewed in Epic and correct as of today:  Health Maintenance   Topic Date Due    HEPATITIS B IMMUNIZATION (2 of 3 - 3-dose series) 09/09/1999    COVID-19 Vaccine (3 - Moderna series) 08/18/2021    MEDICARE ANNUAL WELLNESS VISIT  11/29/2022    ANNUAL REVIEW OF HM ORDERS  11/29/2022    INFLUENZA VACCINE (1) 09/01/2023    LIPID  11/10/2023    HPV TEST  11/29/2026    PAP  11/29/2026    ADVANCE CARE PLANNING  08/11/2028    DTAP/TDAP/TD IMMUNIZATION (8 - Td or Tdap) 11/29/2031    HEPATITIS C SCREENING  Completed    HIV SCREENING  Completed    PHQ-2 (once per calendar year)  Completed    IPV IMMUNIZATION  Completed    Pneumococcal Vaccine: Pediatrics (0 to 5 Years) and At-Risk Patients (6 to 64 Years)  Aged Out    MENINGITIS IMMUNIZATION  Aged Out        Patient has been advised of split billing requirements and indicates understanding: Yes    Appropriate preventive services were discussed  "with this patient, including applicable screening as appropriate for fall prevention, nutrition, physical activity, Tobacco-use cessation, weight loss and cognition.  Checklist reviewing preventive services available has been given to the patient.        Review of Systems   Legs feel fine    Varicose veins much better    Patient done with prescription blood thinner    Just ran out of iron pill months ago     Periods regular but light    A lot less bleeding than used to     No need for birth control       Objective    /79 (BP Location: Right arm, Patient Position: Chair, Cuff Size: Adult Large)   Pulse 104   Temp 98  F (36.7  C) (Temporal)   Resp 18   Ht 1.714 m (5' 7.48\")   Wt (!) 161.1 kg (355 lb 4 oz)   LMP 08/04/2023   SpO2 97%   BMI 54.85 kg/m    Body mass index is 54.85 kg/m .  Physical Exam  Constitutional:       Appearance: She is well-developed.   HENT:      Head: Normocephalic and atraumatic.   Eyes:      Conjunctiva/sclera: Conjunctivae normal.   Neck:      Vascular: No carotid bruit.   Cardiovascular:      Rate and Rhythm: Normal rate and regular rhythm.      Heart sounds: Normal heart sounds.   Pulmonary:      Effort: Pulmonary effort is normal. No respiratory distress.      Breath sounds: Normal breath sounds.   Abdominal:      Palpations: Abdomen is soft.      Tenderness: There is no abdominal tenderness.   Neurological:      Mental Status: She is alert and oriented to person, place, and time.      No pitting edema    Small varicose veins present but nontender            Labs pending                  She is at risk for lack of exercise and has been provided with information to increase physical activity for the benefit of her well-being.        ASSESSMENT / PLAN:  (Z00.00) Encounter for Medicare annual wellness exam  (primary encounter diagnosis)  Comment: main overall health concern is wt  Plan: keep working on healthy diet/exercise and wt loss    (R53.83) Fatigue, unspecified " type  Comment: check labs   Plan: TSH with free T4 reflex, Comprehensive         metabolic panel, CBC with Platelets &         Differential             (R73.01) Impaired fasting glucose  Comment: check   Plan: Hemoglobin A1c             (Z86.2) History of anemia  Comment: not on iron pill currently and with her not having much period bleeding, hopefully  labs will be okay   Plan: Iron and iron binding capacity, Ferritin             (I82.819) Chronic superficial venous thrombosis of lower extremity, unspecified laterality  Comment: discussed in detail.  Showed patient images on internet of superficial and deep veins.  Recent ultrasound shows some improvement and her legs feel better.  Okay to stay off DOAC.  Do start 81 mg asa.   Plan: aspirin 81 MG EC tablet             (F20.9) Schizophrenia, unspecified type (H)  Comment: per psychiatry   Plan: as above     (E66.01) Morbid obesity (H)  Comment: discussed in detail   Plan: keep working on healthy diet/exercise and wt loss     (M71.20) Synovial cyst of popliteal space, unspecified laterality  Comment: baker cyst not worrisome   Plan: monitor      I reviewed the patient's medications, allergies, medical history, family history, and social history.    Harsha German MD

## 2023-08-11 NOTE — LETTER
August 14, 2023    Mónica Yoon  [address] [place] [address]   [facility] [address]          Dear ,    We are writing to inform you of your test results.    Iron level and hemoglobin ( red blood count ) are both a bit low.  Advise taking one over the counter iron pill either daily or every other day, then recheck labs in 4-6 months.     Other labs are good.     Resulted Orders   TSH with free T4 reflex   Result Value Ref Range    TSH 1.25 0.30 - 4.20 uIU/mL   Comprehensive metabolic panel   Result Value Ref Range    Sodium 140 136 - 145 mmol/L    Potassium 3.7 3.4 - 5.3 mmol/L    Chloride 105 98 - 107 mmol/L    Carbon Dioxide (CO2) 24 22 - 29 mmol/L    Anion Gap 11 7 - 15 mmol/L    Urea Nitrogen 8.8 6.0 - 20.0 mg/dL    Creatinine 0.88 0.51 - 0.95 mg/dL    Calcium 8.6 8.6 - 10.0 mg/dL    Glucose 102 (H) 70 - 99 mg/dL    Alkaline Phosphatase 89 35 - 104 U/L    AST 20 0 - 45 U/L      Comment:      Reference intervals for this test were updated on 6/12/2023 to more accurately reflect our healthy population. There may be differences in the flagging of prior results with similar values performed with this method. Interpretation of those prior results can be made in the context of the updated reference intervals.    ALT 11 0 - 50 U/L      Comment:      Reference intervals for this test were updated on 6/12/2023 to more accurately reflect our healthy population. There may be differences in the flagging of prior results with similar values performed with this method. Interpretation of those prior results can be made in the context of the updated reference intervals.      Protein Total 6.8 6.4 - 8.3 g/dL    Albumin 3.9 3.5 - 5.2 g/dL    Bilirubin Total <0.2 <=1.2 mg/dL    GFR Estimate 85 >60 mL/min/1.73m2   Hemoglobin A1c   Result Value Ref Range    Hemoglobin A1C 5.4 0.0 - 5.6 %      Comment:      Normal <5.7%   Prediabetes 5.7-6.4%    Diabetes 6.5% or higher     Note: Adopted from ADA consensus guidelines.   Iron and  iron binding capacity   Result Value Ref Range    Iron 28 (L) 37 - 145 ug/dL    Iron Binding Capacity 262 240 - 430 ug/dL    Iron Sat Index 11 (L) 15 - 46 %   Ferritin   Result Value Ref Range    Ferritin 38 6 - 175 ng/mL   CBC with platelets and differential   Result Value Ref Range    WBC Count 9.2 4.0 - 11.0 10e3/uL    RBC Count 4.10 3.80 - 5.20 10e6/uL    Hemoglobin 11.2 (L) 11.7 - 15.7 g/dL    Hematocrit 34.6 (L) 35.0 - 47.0 %    MCV 84 78 - 100 fL    MCH 27.3 26.5 - 33.0 pg    MCHC 32.4 31.5 - 36.5 g/dL    RDW 14.4 10.0 - 15.0 %    Platelet Count 305 150 - 450 10e3/uL    % Neutrophils 63 %    % Lymphocytes 30 %    % Monocytes 6 %    % Eosinophils 1 %    % Basophils 0 %    % Immature Granulocytes 0 %    Absolute Neutrophils 5.8 1.6 - 8.3 10e3/uL    Absolute Lymphocytes 2.7 0.8 - 5.3 10e3/uL    Absolute Monocytes 0.5 0.0 - 1.3 10e3/uL    Absolute Eosinophils 0.1 0.0 - 0.7 10e3/uL    Absolute Basophils 0.0 0.0 - 0.2 10e3/uL    Absolute Immature Granulocytes 0.0 <=0.4 10e3/uL     If you have any questions or concerns, please call the clinic at the number listed above.       Sincerely,      Harsha German MD

## 2023-08-11 NOTE — PATIENT INSTRUCTIONS
"  Stay off prescription blood thinner    Do start the aspirin 81 mg daily    Stay well hydrated    Increase walking    We will send you lab results            Patient Education   Personalized Prevention Plan  You are due for the preventive services outlined below.  Your care team is available to assist you in scheduling these services.  If you have already completed any of these items, please share that information with your care team to update in your medical record.  Health Maintenance Due   Topic Date Due    Hepatitis B Vaccine (2 of 3 - 3-dose series) 09/09/1999    COVID-19 Vaccine (3 - Moderna series) 08/18/2021    Annual Wellness Visit  11/29/2022    ANNUAL REVIEW OF HM ORDERS  11/29/2022     Learning About Being Physically Active  What is physical activity?     Being physically active means doing any kind of activity that gets your body moving.  The types of physical activity that can help you get fit and stay healthy include:  Aerobic or \"cardio\" activities. These make your heart beat faster and make you breathe harder, such as brisk walking, riding a bike, or running. They strengthen your heart and lungs and build up your endurance.  Strength training activities. These make your muscles work against, or \"resist,\" something. Examples include lifting weights or doing push-ups. These activities help tone and strengthen your muscles and bones.  Stretches. These let you move your joints and muscles through their full range of motion. Stretching helps you be more flexible.  Reaching a balance between these three types of physical activity is important because each one contributes to your overall fitness.  What are the benefits of being active?  Being active is one of the best things you can do for your health. It helps you to:  Feel stronger and have more energy to do all the things you like to do.  Focus better at school or work.  Feel, think, and sleep better.  Reach and stay at a healthy weight.  Lose fat and " "build lean muscle.  Lower your risk for serious health problems, including diabetes, heart attack, high blood pressure, and some cancers.  Keep your heart, lungs, bones, muscles, and joints strong and healthy.  How can you make being active part of your life?  Start slowly. Make it your long-term goal to get at least 30 minutes of exercise on most days of the week. Walking is a good choice. You also may want to do other activities, such as running, swimming, cycling, or playing tennis or team sports.  Pick activities that you like--ones that make your heart beat faster, your muscles stronger, and your muscles and joints more flexible. If you find more than one thing you like doing, do them all. You don't have to do the same thing every day.  Get your heart pumping every day. Any activity that makes your heart beat faster and keeps it at that rate for a while counts.  Here are some great ways to get your heart beating faster:  Go for a brisk walk, run, or bike ride.  Go for a hike or swim.  Go in-line skating.  Play a game of touch football, basketball, or soccer.  Ride a bike.  Play tennis or racquetball.  Climb stairs.  Even some household chores can be aerobic--just do them at a faster pace. Vacuuming, raking or mowing the lawn, sweeping the garage, and washing and waxing the car all can help get your heart rate up.  Strengthen your muscles during the week. You don't have to lift heavy weights or grow big, bulky muscles to get stronger. Doing a few simple activities that make your muscles work against, or \"resist,\" something can help you get stronger.  For example, you can:  Do push-ups or sit-ups, which use your own body weight as resistance.  Lift weights or dumbbells or use stretch bands at home or in a gym or community center.  Stretch your muscles often. Stretching will help you as you become more active. It can help you stay flexible, loosen tight muscles, and avoid injury. It can also help improve your " "balance and posture and can be a great way to relax.  Be sure to stretch the muscles you'll be using when you work out. It's best to warm your muscles slightly before you stretch them. Walk or do some other light aerobic activity for a few minutes, and then start stretching.  When you stretch your muscles:  Do it slowly. Stretching is not about going fast or making sudden movements.  Don't push or bounce during a stretch.  Hold each stretch for at least 15 to 30 seconds, if you can. You should feel a stretch in the muscle, but not pain.  Breathe out as you do the stretch. Then breathe in as you hold the stretch. Don't hold your breath.  If you're worried about how more activity might affect your health, have a checkup before you start. Follow any special advice your doctor gives you for getting a smart start.  Where can you learn more?  Go to https://www.[employer].net/patiented  Enter W332 in the search box to learn more about \"Learning About Being Physically Active.\"  Current as of: October 10, 2022               Content Version: 13.7    3046-8932 [employer].   Care instructions adapted under license by your healthcare professional. If you have questions about a medical condition or this instruction, always ask your healthcare professional. [employer] disclaims any warranty or liability for your use of this information.         "

## 2023-08-14 NOTE — RESULT ENCOUNTER NOTE
Iron level and hemoglobin ( red blood count ) are both a bit low.  Advise taking one over the counter iron pill either daily or every other day, then recheck labs in 4-6 months.    Other labs are good.    Harsha German MD

## 2023-10-17 DIAGNOSIS — Z79.899 HIGH RISK MEDICATION USE: Primary | ICD-10-CM

## 2024-09-07 DIAGNOSIS — I82.819 CHRONIC SUPERFICIAL VENOUS THROMBOSIS OF LOWER EXTREMITY, UNSPECIFIED LATERALITY: ICD-10-CM

## 2024-09-08 RX ORDER — ASPIRIN 81 MG/1
81 TABLET, COATED ORAL DAILY
Qty: 100 TABLET | Refills: 0 | Status: SHIPPED | OUTPATIENT
Start: 2024-09-08

## 2024-10-01 DIAGNOSIS — F25.1 SCHIZOAFFECTIVE DISORDER, DEPRESSIVE TYPE (H): Primary | ICD-10-CM

## 2024-10-01 DIAGNOSIS — Z79.899 HIGH RISK MEDICATION USE: ICD-10-CM

## 2024-10-01 DIAGNOSIS — Z13.220 SCREENING FOR LIPOID DISORDERS: ICD-10-CM

## 2024-10-11 ENCOUNTER — OFFICE VISIT (OUTPATIENT)
Dept: FAMILY MEDICINE | Facility: CLINIC | Age: 39
End: 2024-10-11
Payer: COMMERCIAL

## 2024-10-11 VITALS
HEART RATE: 79 BPM | SYSTOLIC BLOOD PRESSURE: 120 MMHG | HEIGHT: 67 IN | WEIGHT: 293 LBS | BODY MASS INDEX: 45.99 KG/M2 | RESPIRATION RATE: 18 BRPM | OXYGEN SATURATION: 97 % | DIASTOLIC BLOOD PRESSURE: 89 MMHG | TEMPERATURE: 97.8 F

## 2024-10-11 DIAGNOSIS — Z13.220 SCREENING FOR LIPOID DISORDERS: ICD-10-CM

## 2024-10-11 DIAGNOSIS — Z82.49 FAMILY HISTORY OF HYPERTENSION: ICD-10-CM

## 2024-10-11 DIAGNOSIS — F25.1 SCHIZOAFFECTIVE DISORDER, DEPRESSIVE TYPE (H): ICD-10-CM

## 2024-10-11 DIAGNOSIS — Z23 ENCOUNTER FOR IMMUNIZATION: ICD-10-CM

## 2024-10-11 DIAGNOSIS — Z79.899 HIGH RISK MEDICATION USE: ICD-10-CM

## 2024-10-11 DIAGNOSIS — R00.2 PALPITATIONS: ICD-10-CM

## 2024-10-11 DIAGNOSIS — R53.83 FATIGUE, UNSPECIFIED TYPE: ICD-10-CM

## 2024-10-11 DIAGNOSIS — E66.01 MORBID OBESITY (H): Primary | ICD-10-CM

## 2024-10-11 DIAGNOSIS — Z12.31 VISIT FOR SCREENING MAMMOGRAM: ICD-10-CM

## 2024-10-11 LAB
ALBUMIN SERPL BCG-MCNC: 3.7 G/DL (ref 3.5–5.2)
ALP SERPL-CCNC: 85 U/L (ref 40–150)
ALT SERPL W P-5'-P-CCNC: 16 U/L (ref 0–50)
ANION GAP SERPL CALCULATED.3IONS-SCNC: 10 MMOL/L (ref 7–15)
AST SERPL W P-5'-P-CCNC: 17 U/L (ref 0–45)
BASOPHILS # BLD AUTO: 0 10E3/UL (ref 0–0.2)
BASOPHILS NFR BLD AUTO: 0 %
BILIRUB SERPL-MCNC: 0.2 MG/DL
BUN SERPL-MCNC: 11.3 MG/DL (ref 6–20)
CALCIUM SERPL-MCNC: 8.4 MG/DL (ref 8.8–10.4)
CHLORIDE SERPL-SCNC: 105 MMOL/L (ref 98–107)
CHOLEST SERPL-MCNC: 147 MG/DL
CREAT SERPL-MCNC: 0.96 MG/DL (ref 0.51–0.95)
EGFRCR SERPLBLD CKD-EPI 2021: 76 ML/MIN/1.73M2
EOSINOPHIL # BLD AUTO: 0.2 10E3/UL (ref 0–0.7)
EOSINOPHIL NFR BLD AUTO: 3 %
ERYTHROCYTE [DISTWIDTH] IN BLOOD BY AUTOMATED COUNT: 15.3 % (ref 10–15)
EST. AVERAGE GLUCOSE BLD GHB EST-MCNC: 103 MG/DL
FASTING STATUS PATIENT QL REPORTED: YES
GLUCOSE SERPL-MCNC: 91 MG/DL (ref 70–99)
GLUCOSE SERPL-MCNC: 91 MG/DL (ref 70–99)
HBA1C MFR BLD: 5.2 % (ref 0–5.6)
HCO3 SERPL-SCNC: 23 MMOL/L (ref 22–29)
HCT VFR BLD AUTO: 35.9 % (ref 35–47)
HDLC SERPL-MCNC: 32 MG/DL
HGB BLD-MCNC: 11.5 G/DL (ref 11.7–15.7)
IMM GRANULOCYTES # BLD: 0 10E3/UL
IMM GRANULOCYTES NFR BLD: 0 %
LDLC SERPL CALC-MCNC: 92 MG/DL
LYMPHOCYTES # BLD AUTO: 1.9 10E3/UL (ref 0.8–5.3)
LYMPHOCYTES NFR BLD AUTO: 31 %
MCH RBC QN AUTO: 28.3 PG (ref 26.5–33)
MCHC RBC AUTO-ENTMCNC: 32 G/DL (ref 31.5–36.5)
MCV RBC AUTO: 88 FL (ref 78–100)
MONOCYTES # BLD AUTO: 0.4 10E3/UL (ref 0–1.3)
MONOCYTES NFR BLD AUTO: 6 %
NEUTROPHILS # BLD AUTO: 3.6 10E3/UL (ref 1.6–8.3)
NEUTROPHILS NFR BLD AUTO: 60 %
NONHDLC SERPL-MCNC: 115 MG/DL
PLATELET # BLD AUTO: 282 10E3/UL (ref 150–450)
POTASSIUM SERPL-SCNC: 4.3 MMOL/L (ref 3.4–5.3)
PROLACTIN SERPL 3RD IS-MCNC: 47 NG/ML (ref 5–23)
PROT SERPL-MCNC: 6.5 G/DL (ref 6.4–8.3)
RBC # BLD AUTO: 4.06 10E6/UL (ref 3.8–5.2)
SODIUM SERPL-SCNC: 138 MMOL/L (ref 135–145)
TRIGL SERPL-MCNC: 117 MG/DL
TSH SERPL DL<=0.005 MIU/L-ACNC: 1.54 UIU/ML (ref 0.3–4.2)
WBC # BLD AUTO: 6 10E3/UL (ref 4–11)

## 2024-10-11 PROCEDURE — 80053 COMPREHEN METABOLIC PANEL: CPT | Performed by: FAMILY MEDICINE

## 2024-10-11 PROCEDURE — 84443 ASSAY THYROID STIM HORMONE: CPT | Performed by: FAMILY MEDICINE

## 2024-10-11 PROCEDURE — 99214 OFFICE O/P EST MOD 30 MIN: CPT | Mod: 25 | Performed by: FAMILY MEDICINE

## 2024-10-11 PROCEDURE — 36415 COLL VENOUS BLD VENIPUNCTURE: CPT | Performed by: FAMILY MEDICINE

## 2024-10-11 PROCEDURE — 83036 HEMOGLOBIN GLYCOSYLATED A1C: CPT | Performed by: FAMILY MEDICINE

## 2024-10-11 PROCEDURE — G0008 ADMIN INFLUENZA VIRUS VAC: HCPCS | Performed by: FAMILY MEDICINE

## 2024-10-11 PROCEDURE — 90656 IIV3 VACC NO PRSV 0.5 ML IM: CPT | Performed by: FAMILY MEDICINE

## 2024-10-11 PROCEDURE — 80061 LIPID PANEL: CPT | Performed by: FAMILY MEDICINE

## 2024-10-11 PROCEDURE — 85025 COMPLETE CBC W/AUTO DIFF WBC: CPT | Performed by: FAMILY MEDICINE

## 2024-10-11 PROCEDURE — 84146 ASSAY OF PROLACTIN: CPT | Performed by: FAMILY MEDICINE

## 2024-10-11 ASSESSMENT — PAIN SCALES - GENERAL: PAINLEVEL: NO PAIN (0)

## 2024-10-11 NOTE — PATIENT INSTRUCTIONS
Keep working on healthy diet/exercise and weight loss    Low sodium diet    We will send you lab results

## 2024-10-11 NOTE — PROGRESS NOTES
"      Dolores Sales is a 39 year old, presenting for the following health issues:  Hypertension        10/11/2024     8:20 AM   Additional Questions   Roomed by Virginia Brooks     History of Present Illness       Reason for visit:  High blood preser  Symptom onset:  3-7 days ago  Symptom intensity:  Mild  Symptom progression:  Staying the same  Had these symptoms before:  No  What makes it worse:  No  What makes it better:  No   She is taking medications regularly.      2-3 cups coffee daily    4-5 diet cokes daily    Not on blood thinner    One time clot in leg    Not much exercise    Not working    No chest pain/ breathing problems    Racing heart with lots of caffeine    Energy level okay    Sleep well, over 8 hours sleep           Objective    BP (!) 148/85 (BP Location: Left arm, Patient Position: Chair, Cuff Size: Adult Large)   Pulse 79   Temp 97.8  F (36.6  C) (Temporal)   Resp 18   Ht 1.714 m (5' 7.48\")   Wt (!) 169 kg (372 lb 8 oz)   LMP 09/20/2024 (Exact Date)   SpO2 97%   BMI 57.51 kg/m    Body mass index is 57.51 kg/m .  Physical Exam  Constitutional:       Appearance: She is well-developed.   HENT:      Head: Normocephalic and atraumatic.   Eyes:      Conjunctiva/sclera: Conjunctivae normal.   Neck:      Vascular: No carotid bruit.   Cardiovascular:      Rate and Rhythm: Normal rate and regular rhythm.      Heart sounds: Normal heart sounds.   Pulmonary:      Effort: Pulmonary effort is normal. No respiratory distress.      Breath sounds: Normal breath sounds.   Neurological:      Mental Status: She is alert and oriented to person, place, and time.      No pitting edema    Radials symmetric           ASSESSMENT / PLAN:  (E66.01) Morbid obesity (H)  (primary encounter diagnosis)  Comment: wt very concerning   Plan: keep working on healthy diet/exercise and wt loss as able     (Z82.49) Family history of hypertension  Comment: on recheck blood pressure not bad here.  We agreed to hold off on " blood pressure med for now.  Plan: monitor blood pressure occasionally.  If going up, return to clinic.  Low sodium diet.     (Z12.31) Visit for screening mammogram  Comment:  patient to schedule    Plan: MA Screening Bilateral w/ Rey             (Z23) Encounter for immunization  Comment: needs flu shot   Plan: INFLUENZA VACCINE,SPLIT         VIRUS,TRIVALENT,PF(FLUZONE), SCREENING         QUESTIONS FOR ADULT IMMUNIZATIONS             (R53.83) Fatigue, unspecified type  Comment: check   Plan: TSH with free T4 reflex, CBC with Platelets &         Differential, Comprehensive metabolic panel             (F25.1) Schizoaffective disorder, depressive type (H)  Comment: patient on several meds per psychiatry   Plan: released orders from specialist     (Z79.899) High risk medication use  Comment: as above   Plan: as above     (Z13.220) Screening for lipoid disorders  Comment: as above   Plan: as above     (R00.2) Palpitations  Comment: occasional. Discussed in detail.   Plan: decrease caffeine, follow up prn symptoms.       I reviewed the patient's medications, allergies, medical history, family history, and social history.    Harsha German MD          Signed Electronically by: Harsha German MD

## 2024-10-11 NOTE — LETTER
October 14, 2024      Mónica Yoon  [address] [place] [address]   [facility] [address]        Dear ,    We are writing to inform you of your test results.    Hemoglobin ( red blood count ) a bit low but stable.     Thyroid normal.     Liver and kidney tests okay.     Resulted Orders   TSH with free T4 reflex   Result Value Ref Range    TSH 1.54 0.30 - 4.20 uIU/mL   Comprehensive metabolic panel   Result Value Ref Range    Sodium 138 135 - 145 mmol/L    Potassium 4.3 3.4 - 5.3 mmol/L    Carbon Dioxide (CO2) 23 22 - 29 mmol/L    Anion Gap 10 7 - 15 mmol/L    Urea Nitrogen 11.3 6.0 - 20.0 mg/dL    Creatinine 0.96 (H) 0.51 - 0.95 mg/dL    GFR Estimate 76 >60 mL/min/1.73m2      Comment:      eGFR calculated using 2021 CKD-EPI equation.    Calcium 8.4 (L) 8.8 - 10.4 mg/dL      Comment:      Reference intervals for this test were updated on 7/16/2024 to reflect our healthy population more accurately. There may be differences in the flagging of prior results with similar values performed with this method. Those prior results can be interpreted in the context of the updated reference intervals.    Chloride 105 98 - 107 mmol/L    Glucose 91 70 - 99 mg/dL    Alkaline Phosphatase 85 40 - 150 U/L    AST 17 0 - 45 U/L    ALT 16 0 - 50 U/L    Protein Total 6.5 6.4 - 8.3 g/dL    Albumin 3.7 3.5 - 5.2 g/dL    Bilirubin Total 0.2 <=1.2 mg/dL    Patient Fasting > 8hrs? Yes    CBC with platelets and differential   Result Value Ref Range    WBC Count 6.0 4.0 - 11.0 10e3/uL    RBC Count 4.06 3.80 - 5.20 10e6/uL    Hemoglobin 11.5 (L) 11.7 - 15.7 g/dL    Hematocrit 35.9 35.0 - 47.0 %    MCV 88 78 - 100 fL    MCH 28.3 26.5 - 33.0 pg    MCHC 32.0 31.5 - 36.5 g/dL    RDW 15.3 (H) 10.0 - 15.0 %    Platelet Count 282 150 - 450 10e3/uL    % Neutrophils 60 %    % Lymphocytes 31 %    % Monocytes 6 %    % Eosinophils 3 %    % Basophils 0 %    % Immature Granulocytes 0 %    Absolute Neutrophils 3.6 1.6 - 8.3 10e3/uL    Absolute Lymphocytes  1.9 0.8 - 5.3 10e3/uL    Absolute Monocytes 0.4 0.0 - 1.3 10e3/uL    Absolute Eosinophils 0.2 0.0 - 0.7 10e3/uL    Absolute Basophils 0.0 0.0 - 0.2 10e3/uL    Absolute Immature Granulocytes 0.0 <=0.4 10e3/uL       If you have any questions or concerns, please call the clinic at the number listed above.       Sincerely,      Harsha German MD

## 2024-10-12 NOTE — RESULT ENCOUNTER NOTE
Hemoglobin ( red blood count ) a bit low but stable.    Thyroid normal.    Liver and kidney tests okay.    Harsha German MD      Please mail letter and results to patient   Thanks  Harsha German MD